# Patient Record
Sex: FEMALE | Race: WHITE | Employment: OTHER | URBAN - METROPOLITAN AREA
[De-identification: names, ages, dates, MRNs, and addresses within clinical notes are randomized per-mention and may not be internally consistent; named-entity substitution may affect disease eponyms.]

---

## 2018-06-15 ENCOUNTER — HOSPITAL ENCOUNTER (OUTPATIENT)
Age: 83
Discharge: HOME | End: 2018-06-17
Payer: MEDICARE

## 2018-06-15 DIAGNOSIS — E78.49 OTHER HYPERLIPIDEMIA: ICD-10-CM

## 2018-06-15 DIAGNOSIS — E03.9 ACQUIRED HYPOTHYROIDISM: ICD-10-CM

## 2018-06-15 DIAGNOSIS — I10 ESSENTIAL HYPERTENSION: ICD-10-CM

## 2018-06-15 LAB
ALBUMIN SERPL-MCNC: 3.6 G/DL (ref 3.5–5.2)
ALP BLD-CCNC: 61 U/L (ref 35–104)
ALT SERPL-CCNC: 11 U/L (ref 0–32)
ANION GAP SERPL CALCULATED.3IONS-SCNC: 14 MMOL/L (ref 7–16)
AST SERPL-CCNC: 28 U/L (ref 0–31)
BASOPHILS ABSOLUTE: 0.06 E9/L (ref 0–0.2)
BASOPHILS RELATIVE PERCENT: 1.2 % (ref 0–2)
BILIRUB SERPL-MCNC: 0.3 MG/DL (ref 0–1.2)
BUN BLDV-MCNC: 31 MG/DL (ref 8–23)
CALCIUM SERPL-MCNC: 9.2 MG/DL (ref 8.6–10.2)
CHLORIDE BLD-SCNC: 94 MMOL/L (ref 98–107)
CHOLESTEROL, TOTAL: 145 MG/DL (ref 0–199)
CO2: 25 MMOL/L (ref 22–29)
CREAT SERPL-MCNC: 0.9 MG/DL (ref 0.5–1)
EOSINOPHILS ABSOLUTE: 0.06 E9/L (ref 0.05–0.5)
EOSINOPHILS RELATIVE PERCENT: 1.2 % (ref 0–6)
GFR AFRICAN AMERICAN: >60
GFR NON-AFRICAN AMERICAN: 59 ML/MIN/1.73
GLUCOSE BLD-MCNC: 79 MG/DL (ref 74–109)
HCT VFR BLD CALC: 33.9 % (ref 34–48)
HDLC SERPL-MCNC: 73 MG/DL
HEMOGLOBIN: 10.8 G/DL (ref 11.5–15.5)
IMMATURE GRANULOCYTES #: 0.02 E9/L
IMMATURE GRANULOCYTES %: 0.4 % (ref 0–5)
LDL CHOLESTEROL CALCULATED: 63 MG/DL (ref 0–99)
LYMPHOCYTES ABSOLUTE: 1.15 E9/L (ref 1.5–4)
LYMPHOCYTES RELATIVE PERCENT: 23.9 % (ref 20–42)
MCH RBC QN AUTO: 28.9 PG (ref 26–35)
MCHC RBC AUTO-ENTMCNC: 31.9 % (ref 32–34.5)
MCV RBC AUTO: 90.6 FL (ref 80–99.9)
MONOCYTES ABSOLUTE: 0.66 E9/L (ref 0.1–0.95)
MONOCYTES RELATIVE PERCENT: 13.7 % (ref 2–12)
NEUTROPHILS ABSOLUTE: 2.86 E9/L (ref 1.8–7.3)
NEUTROPHILS RELATIVE PERCENT: 59.6 % (ref 43–80)
PDW BLD-RTO: 16.9 FL (ref 11.5–15)
PLATELET # BLD: 278 E9/L (ref 130–450)
PMV BLD AUTO: 12.2 FL (ref 7–12)
POTASSIUM SERPL-SCNC: 4.2 MMOL/L (ref 3.5–5)
RBC # BLD: 3.74 E12/L (ref 3.5–5.5)
SODIUM BLD-SCNC: 133 MMOL/L (ref 132–146)
TOTAL PROTEIN: 7.2 G/DL (ref 6.4–8.3)
TRIGL SERPL-MCNC: 46 MG/DL (ref 0–149)
TSH SERPL DL<=0.05 MIU/L-ACNC: 4.62 UIU/ML (ref 0.27–4.2)
VLDLC SERPL CALC-MCNC: 9 MG/DL
WBC # BLD: 4.8 E9/L (ref 4.5–11.5)

## 2018-06-15 PROCEDURE — 80061 LIPID PANEL: CPT

## 2018-06-15 PROCEDURE — 80053 COMPREHEN METABOLIC PANEL: CPT

## 2018-06-15 PROCEDURE — 84443 ASSAY THYROID STIM HORMONE: CPT

## 2018-06-15 PROCEDURE — 85025 COMPLETE CBC W/AUTO DIFF WBC: CPT

## 2019-08-04 ENCOUNTER — HOSPITAL ENCOUNTER (EMERGENCY)
Age: 84
Discharge: HOME | End: 2019-08-04
Attending: EMERGENCY MEDICINE
Payer: MEDICARE

## 2019-08-04 VITALS
DIASTOLIC BLOOD PRESSURE: 92 MMHG | HEART RATE: 88 BPM | RESPIRATION RATE: 18 BRPM | SYSTOLIC BLOOD PRESSURE: 161 MMHG | TEMPERATURE: 97.6 F | OXYGEN SATURATION: 96 %

## 2019-08-04 DIAGNOSIS — S81.811A LACERATION OF RIGHT LOWER EXTREMITY, INITIAL ENCOUNTER: Primary | ICD-10-CM

## 2019-08-04 PROCEDURE — 99283 EMERGENCY DEPT VISIT LOW MDM: CPT

## 2019-08-04 PROCEDURE — 2500000003 HC RX 250 WO HCPCS: Performed by: EMERGENCY MEDICINE

## 2019-08-04 PROCEDURE — 6370000000 HC RX 637 (ALT 250 FOR IP): Performed by: EMERGENCY MEDICINE

## 2019-08-04 PROCEDURE — 12005 RPR S/N/A/GEN/TRK12.6-20.0CM: CPT

## 2019-08-04 PROCEDURE — 90471 IMMUNIZATION ADMIN: CPT | Performed by: EMERGENCY MEDICINE

## 2019-08-04 PROCEDURE — 6360000002 HC RX W HCPCS: Performed by: EMERGENCY MEDICINE

## 2019-08-04 PROCEDURE — 90715 TDAP VACCINE 7 YRS/> IM: CPT | Performed by: EMERGENCY MEDICINE

## 2019-08-04 RX ORDER — DIAPER,BRIEF,INFANT-TODD,DISP
EACH MISCELLANEOUS ONCE
Status: COMPLETED | OUTPATIENT
Start: 2019-08-04 | End: 2019-08-04

## 2019-08-04 RX ORDER — LIDOCAINE HYDROCHLORIDE AND EPINEPHRINE 10; 10 MG/ML; UG/ML
20 INJECTION, SOLUTION INFILTRATION; PERINEURAL ONCE
Status: COMPLETED | OUTPATIENT
Start: 2019-08-04 | End: 2019-08-04

## 2019-08-04 RX ADMIN — TETANUS TOXOID, REDUCED DIPHTHERIA TOXOID AND ACELLULAR PERTUSSIS VACCINE, ADSORBED 0.5 ML: 5; 2.5; 8; 8; 2.5 SUSPENSION INTRAMUSCULAR at 15:18

## 2019-08-04 RX ADMIN — BACITRACIN ZINC: 500 OINTMENT TOPICAL at 15:34

## 2019-08-04 RX ADMIN — LIDOCAINE HYDROCHLORIDE AND EPINEPHRINE 20 ML: 10; 10 INJECTION, SOLUTION INFILTRATION; PERINEURAL at 15:34

## 2019-08-04 ASSESSMENT — ENCOUNTER SYMPTOMS
EYE PAIN: 0
BACK PAIN: 0
NAUSEA: 0
SORE THROAT: 0
SINUS PRESSURE: 0
EYE DISCHARGE: 0
WHEEZING: 0
VOMITING: 0
SHORTNESS OF BREATH: 0
DIARRHEA: 0
EYE REDNESS: 0
ABDOMINAL DISTENTION: 0
COUGH: 0

## 2019-08-04 ASSESSMENT — PAIN DESCRIPTION - PAIN TYPE: TYPE: ACUTE PAIN

## 2019-08-04 ASSESSMENT — PAIN SCALES - GENERAL
PAINLEVEL_OUTOF10: 0
PAINLEVEL_OUTOF10: 5

## 2019-08-04 ASSESSMENT — PAIN DESCRIPTION - ORIENTATION: ORIENTATION: RIGHT

## 2019-08-04 ASSESSMENT — PAIN DESCRIPTION - LOCATION: LOCATION: LEG

## 2019-08-04 NOTE — ED PROVIDER NOTES
80-year-old female history of hypertension, presenting to the emergency department by private vehicle for primary complaint of laceration to right lower extremity. Patient states she was boarding a bus, when she struck her right shin on the step resulting in a skin tear, the patient did not fall. The patient did not hit her head or lose consciousness. She is on a blood thinners. Last tetanus is unknown. Patient has no other complaints at this time. His current complaint is moderate in severity, better with nothing worse with nothing and constant. The history is provided by the patient. Review of Systems   Constitutional: Negative for chills and fever. HENT: Negative for ear pain, sinus pressure and sore throat. Eyes: Negative for pain, discharge and redness. Respiratory: Negative for cough, shortness of breath and wheezing. Cardiovascular: Negative for chest pain. Gastrointestinal: Negative for abdominal distention, diarrhea, nausea and vomiting. Genitourinary: Negative for dysuria and frequency. Musculoskeletal: Negative for arthralgias and back pain. Skin: Positive for wound. Neurological: Negative for weakness and headaches. Hematological: Negative for adenopathy. All other systems reviewed and are negative. Physical Exam   Constitutional: She is oriented to person, place, and time. She appears well-developed and well-nourished. HENT:   Head: Normocephalic and atraumatic. Right Ear: External ear normal.   Left Ear: External ear normal.   Mouth/Throat: Oropharynx is clear and moist.   Eyes: Pupils are equal, round, and reactive to light. Neck: Normal range of motion. Neck supple. Cardiovascular: Normal rate, regular rhythm and normal heart sounds. No murmur heard. Pulmonary/Chest: Effort normal and breath sounds normal. No respiratory distress. She has no wheezes. She has no rales. Abdominal: Soft. Bowel sounds are normal. There is no tenderness.  There is no rebound and no guarding. Musculoskeletal: She exhibits no edema. Neurological: She is alert and oriented to person, place, and time. No cranial nerve deficit. Coordination normal.   Skin: Skin is warm and dry. 8 cm x 10 cm skin tear/laceration of the patient's anterior right lower extremity and a chevron formation there is no evidence of pulsatile bleeding or bone exposure. There is no tenderness to torsion of the lower extremity, patient has full range of motion and sensation as well as good cap refill in the affected extremity. Nursing note and vitals reviewed. Procedures    Laceration Repair Procedure Note    Indication: Laceration    Procedure: The patient was placed in the appropriate position and anesthesia around the laceration was obtained by infiltration using 1% Lidocaine with epinephrine. The area was then draped in a sterile fashion. The laceration was closed with 3-0 Ethilon using interrupted sutures. There were no additional lacerations requiring repair. The wound area was then dressed with bacitracin. Minimal debridement was preformed, flaps were aligned. No foreign body was identified. Total repaired wound length: 18 cm. Other Items: Suture count: 11    The patient tolerated the procedure well. Complications: None    MDM  Number of Diagnoses or Management Options  Diagnosis management comments: 70-year-old female presenting to the emergency department with skin tear secondary to striking her shin on the step of a bus while attempting to board approximately 1 hour prior to arrival in emergency department. Physical exam remarkable for skin tear and a chevron formation to the right lower extremity. Patient's tetanus was updated.   She received partial suture repair for laceration.                --------------------------------------------- PAST HISTORY ---------------------------------------------  Past Medical History:  has a past medical history of Chronic back pain, objective evidence of an acute process requiring hospitalization or inpatient management. They have remained hemodynamically stable throughout their entire ED visit and are stable for discharge with outpatient follow-up. The plan has been discussed in detail and they are aware of the specific conditions for emergent return, as well as the importance of follow-up. New Prescriptions    No medications on file       Diagnosis:  1. Laceration of right lower extremity, initial encounter        Disposition:  Patient's disposition: Discharge to home  Patient's condition is stable.          Radha Sims DO  Resident  08/04/19 9142

## 2019-09-18 ENCOUNTER — APPOINTMENT (OUTPATIENT)
Dept: CT IMAGING | Age: 84
DRG: 281 | End: 2019-09-18
Payer: MEDICARE

## 2019-09-18 ENCOUNTER — HOSPITAL ENCOUNTER (INPATIENT)
Age: 84
LOS: 4 days | Discharge: NURSING, REHAB OR LONG-TERM CARE | DRG: 281 | End: 2019-09-23
Attending: EMERGENCY MEDICINE | Admitting: INTERNAL MEDICINE
Payer: MEDICARE

## 2019-09-18 ENCOUNTER — APPOINTMENT (OUTPATIENT)
Dept: GENERAL RADIOLOGY | Age: 84
DRG: 281 | End: 2019-09-18
Payer: MEDICARE

## 2019-09-18 DIAGNOSIS — M62.82 NON-TRAUMATIC RHABDOMYOLYSIS: ICD-10-CM

## 2019-09-18 DIAGNOSIS — I21.4 NSTEMI (NON-ST ELEVATED MYOCARDIAL INFARCTION) (HCC): Primary | ICD-10-CM

## 2019-09-18 LAB
ANION GAP SERPL CALCULATED.3IONS-SCNC: 10 MMOL/L (ref 7–16)
BACTERIA: ABNORMAL /HPF
BASOPHILS ABSOLUTE: 0.02 E9/L (ref 0–0.2)
BASOPHILS RELATIVE PERCENT: 0.2 % (ref 0–2)
BILIRUBIN URINE: NEGATIVE
BLOOD, URINE: ABNORMAL
BUN BLDV-MCNC: 34 MG/DL (ref 8–23)
CALCIUM SERPL-MCNC: 9.4 MG/DL (ref 8.6–10.2)
CHLORIDE BLD-SCNC: 103 MMOL/L (ref 98–107)
CLARITY: CLEAR
CO2: 29 MMOL/L (ref 22–29)
COLOR: YELLOW
CREAT SERPL-MCNC: 0.9 MG/DL (ref 0.5–1)
EOSINOPHILS ABSOLUTE: 0 E9/L (ref 0.05–0.5)
EOSINOPHILS RELATIVE PERCENT: 0 % (ref 0–6)
GFR AFRICAN AMERICAN: >60
GFR NON-AFRICAN AMERICAN: 59 ML/MIN/1.73
GLUCOSE BLD-MCNC: 111 MG/DL (ref 74–99)
GLUCOSE URINE: NEGATIVE MG/DL
HCT VFR BLD CALC: 38.5 % (ref 34–48)
HEMOGLOBIN: 12.5 G/DL (ref 11.5–15.5)
IMMATURE GRANULOCYTES #: 0.04 E9/L
IMMATURE GRANULOCYTES %: 0.5 % (ref 0–5)
KETONES, URINE: ABNORMAL MG/DL
LEUKOCYTE ESTERASE, URINE: NEGATIVE
LYMPHOCYTES ABSOLUTE: 0.75 E9/L (ref 1.5–4)
LYMPHOCYTES RELATIVE PERCENT: 8.6 % (ref 20–42)
MAGNESIUM: 2.2 MG/DL (ref 1.6–2.6)
MCH RBC QN AUTO: 31 PG (ref 26–35)
MCHC RBC AUTO-ENTMCNC: 32.5 % (ref 32–34.5)
MCV RBC AUTO: 95.5 FL (ref 80–99.9)
MONOCYTES ABSOLUTE: 0.84 E9/L (ref 0.1–0.95)
MONOCYTES RELATIVE PERCENT: 9.6 % (ref 2–12)
NEUTROPHILS ABSOLUTE: 7.11 E9/L (ref 1.8–7.3)
NEUTROPHILS RELATIVE PERCENT: 81.1 % (ref 43–80)
NITRITE, URINE: NEGATIVE
PDW BLD-RTO: 16.9 FL (ref 11.5–15)
PH UA: 6 (ref 5–9)
PLATELET # BLD: 276 E9/L (ref 130–450)
PMV BLD AUTO: 10.8 FL (ref 7–12)
POTASSIUM REFLEX MAGNESIUM: 3.9 MMOL/L (ref 3.5–5)
PRO-BNP: 7373 PG/ML (ref 0–450)
PROTEIN UA: 100 MG/DL
RBC # BLD: 4.03 E12/L (ref 3.5–5.5)
RBC UA: ABNORMAL /HPF (ref 0–2)
SODIUM BLD-SCNC: 142 MMOL/L (ref 132–146)
SPECIFIC GRAVITY UA: 1.02 (ref 1–1.03)
TOTAL CK: 1498 U/L (ref 20–180)
TROPONIN: 1.12 NG/ML (ref 0–0.03)
TROPONIN: 1.22 NG/ML (ref 0–0.03)
TSH SERPL DL<=0.05 MIU/L-ACNC: 6.22 UIU/ML (ref 0.27–4.2)
UROBILINOGEN, URINE: 0.2 E.U./DL
WBC # BLD: 8.8 E9/L (ref 4.5–11.5)
WBC UA: ABNORMAL /HPF (ref 0–5)

## 2019-09-18 PROCEDURE — 81001 URINALYSIS AUTO W/SCOPE: CPT

## 2019-09-18 PROCEDURE — 71045 X-RAY EXAM CHEST 1 VIEW: CPT

## 2019-09-18 PROCEDURE — 99285 EMERGENCY DEPT VISIT HI MDM: CPT

## 2019-09-18 PROCEDURE — 6360000004 HC RX CONTRAST MEDICATION: Performed by: RADIOLOGY

## 2019-09-18 PROCEDURE — 36415 COLL VENOUS BLD VENIPUNCTURE: CPT

## 2019-09-18 PROCEDURE — 6370000000 HC RX 637 (ALT 250 FOR IP): Performed by: EMERGENCY MEDICINE

## 2019-09-18 PROCEDURE — 85025 COMPLETE CBC W/AUTO DIFF WBC: CPT

## 2019-09-18 PROCEDURE — 70450 CT HEAD/BRAIN W/O DYE: CPT

## 2019-09-18 PROCEDURE — 93005 ELECTROCARDIOGRAM TRACING: CPT | Performed by: RADIOLOGY

## 2019-09-18 PROCEDURE — 71275 CT ANGIOGRAPHY CHEST: CPT

## 2019-09-18 PROCEDURE — 6360000002 HC RX W HCPCS: Performed by: EMERGENCY MEDICINE

## 2019-09-18 PROCEDURE — 83735 ASSAY OF MAGNESIUM: CPT

## 2019-09-18 PROCEDURE — 72125 CT NECK SPINE W/O DYE: CPT

## 2019-09-18 PROCEDURE — 93005 ELECTROCARDIOGRAM TRACING: CPT | Performed by: EMERGENCY MEDICINE

## 2019-09-18 PROCEDURE — 83880 ASSAY OF NATRIURETIC PEPTIDE: CPT

## 2019-09-18 PROCEDURE — 84443 ASSAY THYROID STIM HORMONE: CPT

## 2019-09-18 PROCEDURE — 82550 ASSAY OF CK (CPK): CPT

## 2019-09-18 PROCEDURE — 80048 BASIC METABOLIC PNL TOTAL CA: CPT

## 2019-09-18 PROCEDURE — 2580000003 HC RX 258: Performed by: EMERGENCY MEDICINE

## 2019-09-18 PROCEDURE — 96374 THER/PROPH/DIAG INJ IV PUSH: CPT

## 2019-09-18 PROCEDURE — 84484 ASSAY OF TROPONIN QUANT: CPT

## 2019-09-18 RX ORDER — ASPIRIN 81 MG/1
324 TABLET, CHEWABLE ORAL ONCE
Status: COMPLETED | OUTPATIENT
Start: 2019-09-18 | End: 2019-09-18

## 2019-09-18 RX ORDER — 0.9 % SODIUM CHLORIDE 0.9 %
1000 INTRAVENOUS SOLUTION INTRAVENOUS ONCE
Status: COMPLETED | OUTPATIENT
Start: 2019-09-18 | End: 2019-09-18

## 2019-09-18 RX ADMIN — ENOXAPARIN SODIUM 50 MG: 60 INJECTION SUBCUTANEOUS at 21:15

## 2019-09-18 RX ADMIN — ASPIRIN 81 MG 324 MG: 81 TABLET ORAL at 21:15

## 2019-09-18 RX ADMIN — IOPAMIDOL 75 ML: 755 INJECTION, SOLUTION INTRAVENOUS at 20:53

## 2019-09-18 RX ADMIN — SODIUM CHLORIDE 1000 ML: 9 INJECTION, SOLUTION INTRAVENOUS at 21:15

## 2019-09-18 NOTE — ED PROVIDER NOTES
ATTENDING PROVIDER ATTESTATION:     Heike Mo presented to the emergency department for evaluation of Fall (fall earlier today, found on the ground, pt denies LOC -thinners. Pt has abrasion to chin and right cheek. Tentanus UTD per patient. Pt denies any pain at this time. )   and was initially evaluated by the Medical Resident. See Original ED Note for H&P and ED course above. I have reviewed and discussed the case, including pertinent history (medical, surgical, family and social) and exam findings with the Medical Resident assigned to Heike Mo. I have personally performed and/or participated in the history, exam, medical decision making, and procedures and agree with all pertinent clinical information. Critical Care:  Please note that the withdrawal or failure to initiate urgent interventions for this patient would likely result in a life threatening deterioration or permanent disability. Accordingly this patient received 45 minutes of critical care time, excluding separately billable procedures. I have reviewed my findings and recommendations with the assigned Medical Resident, Heike Mo and members of family present at the time of disposition. My findings/plan: The primary encounter diagnosis was NSTEMI (non-ST elevated myocardial infarction) ([facility] [address] A diagnosis of Non-traumatic rhabdomyolysis was also pertinent to this visit. Current Discharge Medication List        Apolinar Runner, MD       HPI:  9/18/19,   Time: 5:01 PM         Heike Mo is a 80 y.o. female presenting to the ED after a fall last night. She lives at [facility]. She states that she remembers being on the floor this morning awoke by her staff and not kim to get up. She remembers going to bed around 9:00 pm last night. She does not remember what happened last night or how she reaches the floor. She denies being on any blood thinners. She does admit to bilateral leg weakness.  She normally Basophils % 0.2 0.0 - 2.0 %    Neutrophils Absolute 7.11 1.80 - 7.30 E9/L    Immature Granulocytes # 0.04 E9/L    Lymphocytes Absolute 0.75 (L) 1.50 - 4.00 E9/L    Monocytes Absolute 0.84 0.10 - 0.95 E9/L    Eosinophils Absolute 0.00 (L) 0.05 - 0.50 E9/L    Basophils Absolute 0.02 0.00 - 0.20 P8/N   Basic Metabolic Panel w/ Reflex to MG   Result Value Ref Range    Sodium 142 132 - 146 mmol/L    Potassium reflex Magnesium 3.9 3.5 - 5.0 mmol/L    Chloride 103 98 - 107 mmol/L    CO2 29 22 - 29 mmol/L    Anion Gap 10 7 - 16 mmol/L    Glucose 111 (H) 74 - 99 mg/dL    BUN 34 (H) 8 - 23 mg/dL    CREATININE 0.9 0.5 - 1.0 mg/dL    GFR Non-African American 59 >=60 mL/min/1.73    GFR African American >60     Calcium 9.4 8.6 - 10.2 mg/dL   Troponin   Result Value Ref Range    Troponin 1.22 (H) 0.00 - 0.03 ng/mL   TSH without Reflex   Result Value Ref Range    TSH 6.220 (H) 0.270 - 4.200 uIU/mL   Urinalysis, reflex to microscopic   Result Value Ref Range    Color, UA Yellow Straw/Yellow    Clarity, UA Clear Clear    Glucose, Ur Negative Negative mg/dL    Bilirubin Urine Negative Negative    Ketones, Urine TRACE (A) Negative mg/dL    Specific Gravity, UA 1.020 1.005 - 1.030    Blood, Urine MODERATE (A) Negative    pH, UA 6.0 5.0 - 9.0    Protein,  (A) Negative mg/dL    Urobilinogen, Urine 0.2 <2.0 E.U./dL    Nitrite, Urine Negative Negative    Leukocyte Esterase, Urine Negative Negative   Microscopic Urinalysis   Result Value Ref Range    WBC, UA NONE 0 - 5 /HPF    RBC, UA 2-5 0 - 2 /HPF    Bacteria, UA RARE (A) /HPF   Brain Natriuretic Peptide   Result Value Ref Range    Pro-BNP 7,373 (H) 0 - 450 pg/mL   Magnesium   Result Value Ref Range    Magnesium 2.2 1.6 - 2.6 mg/dL   Troponin   Result Value Ref Range    Troponin 1.12 (H) 0.00 - 0.03 ng/mL   EKG 12 Lead   Result Value Ref Range    Ventricular Rate 80 BPM    Atrial Rate 80 BPM    P-R Interval 146 ms    QRS Duration 134 ms    Q-T Interval 444 ms    QTc Calculation

## 2019-09-18 NOTE — ED NOTES
Bed: 18A-18  Expected date:   Expected time:   Means of arrival:   Comments:  ems     Tabitha Thompson RN  09/18/19 2652

## 2019-09-19 ENCOUNTER — APPOINTMENT (OUTPATIENT)
Dept: GENERAL RADIOLOGY | Age: 84
DRG: 281 | End: 2019-09-19
Payer: MEDICARE

## 2019-09-19 PROBLEM — I21.4 NSTEMI (NON-ST ELEVATED MYOCARDIAL INFARCTION) (HCC): Status: ACTIVE | Noted: 2019-09-19

## 2019-09-19 LAB
EKG ATRIAL RATE: 681 BPM
EKG ATRIAL RATE: 80 BPM
EKG ATRIAL RATE: 84 BPM
EKG P AXIS: 49 DEGREES
EKG P AXIS: 53 DEGREES
EKG P AXIS: 57 DEGREES
EKG P-R INTERVAL: 146 MS
EKG P-R INTERVAL: 170 MS
EKG Q-T INTERVAL: 428 MS
EKG Q-T INTERVAL: 444 MS
EKG Q-T INTERVAL: 444 MS
EKG QRS DURATION: 110 MS
EKG QRS DURATION: 134 MS
EKG QRS DURATION: 62 MS
EKG QTC CALCULATION (BAZETT): 505 MS
EKG QTC CALCULATION (BAZETT): 512 MS
EKG QTC CALCULATION (BAZETT): 524 MS
EKG R AXIS: 43 DEGREES
EKG R AXIS: 83 DEGREES
EKG R AXIS: 89 DEGREES
EKG T AXIS: 39 DEGREES
EKG T AXIS: 43 DEGREES
EKG T AXIS: 54 DEGREES
EKG VENTRICULAR RATE: 80 BPM
EKG VENTRICULAR RATE: 84 BPM
EKG VENTRICULAR RATE: 84 BPM

## 2019-09-19 PROCEDURE — 6370000000 HC RX 637 (ALT 250 FOR IP): Performed by: INTERNAL MEDICINE

## 2019-09-19 PROCEDURE — 2060000000 HC ICU INTERMEDIATE R&B

## 2019-09-19 PROCEDURE — 97530 THERAPEUTIC ACTIVITIES: CPT | Performed by: PHYSICAL THERAPIST

## 2019-09-19 PROCEDURE — 6360000002 HC RX W HCPCS: Performed by: INTERNAL MEDICINE

## 2019-09-19 PROCEDURE — 93010 ELECTROCARDIOGRAM REPORT: CPT | Performed by: INTERNAL MEDICINE

## 2019-09-19 PROCEDURE — 73502 X-RAY EXAM HIP UNI 2-3 VIEWS: CPT

## 2019-09-19 PROCEDURE — APPSS60 APP SPLIT SHARED TIME 46-60 MINUTES: Performed by: NURSE PRACTITIONER

## 2019-09-19 PROCEDURE — 99222 1ST HOSP IP/OBS MODERATE 55: CPT | Performed by: INTERNAL MEDICINE

## 2019-09-19 PROCEDURE — 97161 PT EVAL LOW COMPLEX 20 MIN: CPT | Performed by: PHYSICAL THERAPIST

## 2019-09-19 PROCEDURE — 97535 SELF CARE MNGMENT TRAINING: CPT

## 2019-09-19 PROCEDURE — 97166 OT EVAL MOD COMPLEX 45 MIN: CPT

## 2019-09-19 RX ORDER — AMLODIPINE BESYLATE 5 MG/1
5 TABLET ORAL DAILY
Status: DISCONTINUED | OUTPATIENT
Start: 2019-09-19 | End: 2019-09-23 | Stop reason: HOSPADM

## 2019-09-19 RX ORDER — ISOSORBIDE MONONITRATE 30 MG/1
30 TABLET, EXTENDED RELEASE ORAL DAILY
Status: DISCONTINUED | OUTPATIENT
Start: 2019-09-19 | End: 2019-09-23 | Stop reason: HOSPADM

## 2019-09-19 RX ORDER — FERROUS SULFATE 325(65) MG
325 TABLET ORAL 2 TIMES DAILY
Status: DISCONTINUED | OUTPATIENT
Start: 2019-09-19 | End: 2019-09-23 | Stop reason: HOSPADM

## 2019-09-19 RX ORDER — METOPROLOL SUCCINATE 25 MG/1
25 TABLET, EXTENDED RELEASE ORAL DAILY
Status: DISCONTINUED | OUTPATIENT
Start: 2019-09-19 | End: 2019-09-23 | Stop reason: HOSPADM

## 2019-09-19 RX ORDER — ASPIRIN 81 MG/1
81 TABLET ORAL DAILY
Status: DISCONTINUED | OUTPATIENT
Start: 2019-09-19 | End: 2019-09-23 | Stop reason: HOSPADM

## 2019-09-19 RX ORDER — LEVOTHYROXINE SODIUM 0.05 MG/1
50 TABLET ORAL DAILY
Status: DISCONTINUED | OUTPATIENT
Start: 2019-09-19 | End: 2019-09-23 | Stop reason: HOSPADM

## 2019-09-19 RX ORDER — VALSARTAN 80 MG/1
80 TABLET ORAL DAILY
COMMUNITY
End: 2019-12-21

## 2019-09-19 RX ORDER — ATORVASTATIN CALCIUM 40 MG/1
40 TABLET, FILM COATED ORAL NIGHTLY
Status: DISCONTINUED | OUTPATIENT
Start: 2019-09-19 | End: 2019-09-23 | Stop reason: HOSPADM

## 2019-09-19 RX ORDER — DONEPEZIL HYDROCHLORIDE 5 MG/1
10 TABLET, FILM COATED ORAL NIGHTLY
Status: DISCONTINUED | OUTPATIENT
Start: 2019-09-19 | End: 2019-09-23 | Stop reason: HOSPADM

## 2019-09-19 RX ORDER — ATORVASTATIN CALCIUM 10 MG/1
1 TABLET, FILM COATED ORAL DAILY
Status: DISCONTINUED | OUTPATIENT
Start: 2019-09-19 | End: 2019-09-19

## 2019-09-19 RX ORDER — VALSARTAN 80 MG/1
80 TABLET ORAL DAILY
Status: DISCONTINUED | OUTPATIENT
Start: 2019-09-19 | End: 2019-09-23 | Stop reason: HOSPADM

## 2019-09-19 RX ADMIN — VALSARTAN 80 MG: 80 TABLET, FILM COATED ORAL at 12:30

## 2019-09-19 RX ADMIN — FERROUS SULFATE TAB 325 MG (65 MG ELEMENTAL FE) 325 MG: 325 (65 FE) TAB at 12:29

## 2019-09-19 RX ADMIN — ENOXAPARIN SODIUM 50 MG: 60 INJECTION SUBCUTANEOUS at 20:25

## 2019-09-19 RX ADMIN — AMLODIPINE BESYLATE 5 MG: 5 TABLET ORAL at 12:29

## 2019-09-19 RX ADMIN — ISOSORBIDE MONONITRATE 30 MG: 30 TABLET ORAL at 10:20

## 2019-09-19 RX ADMIN — ATORVASTATIN CALCIUM 40 MG: 40 TABLET, FILM COATED ORAL at 20:25

## 2019-09-19 RX ADMIN — DONEPEZIL HYDROCHLORIDE 10 MG: 5 TABLET, FILM COATED ORAL at 20:25

## 2019-09-19 RX ADMIN — ENOXAPARIN SODIUM 50 MG: 60 INJECTION SUBCUTANEOUS at 10:21

## 2019-09-19 RX ADMIN — ASPIRIN 81 MG: 81 TABLET, COATED ORAL at 10:33

## 2019-09-19 RX ADMIN — FERROUS SULFATE TAB 325 MG (65 MG ELEMENTAL FE) 325 MG: 325 (65 FE) TAB at 20:25

## 2019-09-19 RX ADMIN — METOPROLOL SUCCINATE 25 MG: 25 TABLET, EXTENDED RELEASE ORAL at 10:20

## 2019-09-19 RX ADMIN — LEVOTHYROXINE SODIUM 50 MCG: 50 TABLET ORAL at 12:30

## 2019-09-19 ASSESSMENT — PAIN SCALES - GENERAL
PAINLEVEL_OUTOF10: 0

## 2019-09-19 NOTE — ED NOTES
Assumed care of pt at this time. Per pt son, pt resides at [facility] and was found down by another resident. Pt uses cane for ambulation. Pt does not appear in distress at this time. When questioned as to why she is here, pt states \"I guess to get checked out\". Pt family is made aware that pt will be admitted and awaiting call back at this time. Also made aware of additional testing that was ordered by provider and that she should be going to scan soon. Pt has no complaints at this time. Respirations are easy and unlabored. Cardiac monitoring in place. No signs of distress noted. Call light within reach. Will continue to monitor.      Jackie Guzman RN  09/18/19 0939

## 2019-09-19 NOTE — CONSULTS
complaints  · Integumentary: Denies rash, hives or pruritis   · Neurological: Denies dizziness, headaches or seizures. No numbness or tingling  · Psychiatric: Denies anxiety or depression. · Endocrine: Denies temperature intolerance. No recent weight change. .  · Hematologic/Lymphatic: Denies abnormal bruising or bleeding. No swollen lymph nodes    PHYSICAL EXAM:   /72   Pulse 91   Temp 97.3 °F (36.3 °C) (Temporal)   Resp 18   Ht 5' 2\" (1.575 m)   Wt 100 lb 3.2 oz (45.5 kg)   SpO2 94%   BMI 18.33 kg/m²   CONST:  Elderly female who appears of stated age. Awake, alert and cooperative. No apparent distress. HEENT:   Head- Normocephalic. +skin abrasion to right cheek and chin. Eyes- Conjunctivae pink, anicteric  Throat- Oral mucosa pink and moist  Neck-  No stridor, trachea midline, no jugular venous distention. No carotid bruit. CHEST: Chest symmetrical and non-tender to palpation. No accessory muscle use or intercostal retractions  RESPIRATORY: Lung sounds - clear throughout fields   CARDIOVASCULAR:     Heart Inspection- shows no noted pulsations  Heart Palpation- no heaves or thrills; PMI is non-displaced   Heart Ausculation- Regular rate and rhythm, no murmur. No s3, s4 or rub   PV: No lower extremity edema. No varicosities. Pedal pulses palpable, no clubbing or cyanosis   ABDOMEN: Soft, non-tender to light palpation. Bowel sounds present. No palpable masses no organomegaly; no abdominal bruit  MS: Good muscle strength and tone. No atrophy or abnormal movements. : Deferred  SKIN: Warm and dry. +scab over right shin - healing. NEURO / PSYCH: Oriented to person, place only. Speech clear and appropriate. Follows all commands. Pleasant affect     DATA:    Tele strips: SR.   EKG: see HPI.    Diagnostic:      Intake/Output Summary (Last 24 hours) at 9/19/2019 0830  Last data filed at 9/19/2019 0556  Gross per 24 hour   Intake 1000 ml   Output 175 ml   Net 825 ml       Labs:   CBC:   Recent Labs and medical decision making. See accompanying documentation for full consult. ASSESSMENT AND PLAN:  1. ACS/NSTEMI:    Patient desires conservative treatment, which is very reasonable. Lovenox x 48 hours. Echo to guide therapy. ASA/statin/BB/imdur. 2. HTN: Observe. 3. Lipids: Statin. 4. Hypothyroid. Yecenia Guerrero D.O.   Cardiologist  Cardiology, [facility]

## 2019-09-19 NOTE — H&P
Mukesh Steinberg MD FACP   History and Physical      CHIEF COMPLAINT:  Patient fell at home      HISTORY OF PRESENT ILLNESS:    25-year-old  woman seen on [address] earlier this afternoon at the main campus  Spoke with the ER physician overnight  Data reviewed in detail  Multiple family members at bedside including Dr. Hemant Yoon  Patient lives in independent living at [place] [address] on the floor by a neighbor  Patient is a poor historian with dementia  She does not recall if she had syncope  She definitely denied chest pain or shortness of breath  Sustained injuries to multiple body parts  ER workup revealed a troponin elevation and CPK elevation  Patient was fairly comfortable at the time of my examination  Denied headache      Right hip pain present    Past Medical History:    Past Medical History:   Diagnosis Date    Chronic back pain     Hyperlipidemia     Hypertension     Thyroid disease        Past Surgical History:    Past Surgical History:   Procedure Laterality Date    TONSILLECTOMY         Medications Prior to Admission:    Medications Prior to Admission: valsartan (DIOVAN) 80 MG tablet, Take 80 mg by mouth daily  [DISCONTINUED] donepezil (ARICEPT) 10 MG tablet, TAKE 1 TABLET BY MOUTH EVERY NIGHT  [DISCONTINUED] ferrous sulfate (FEOSOL) 325 (65 Fe) MG tablet, Take 1 tablet by mouth 2 times daily Indications: Per Dr. Sander Zacarias reference to lab results on 7/2/19  amLODIPine (NORVASC) 5 MG tablet, TAKE 1 TABLET BY MOUTH DAILY  levothyroxine (SYNTHROID) 50 MCG tablet, TAKE 1 TABLET BY MOUTH DAILY  atorvastatin (LIPITOR) 10 MG tablet, TAKE 1 TABLET BY MOUTH DAILY  [DISCONTINUED] donepezil (ARICEPT) 5 MG tablet, Take 1 tablet by mouth nightly  [DISCONTINUED] Handicap Placard MISC, by Does not apply route Patient cannot walk 200 ft without stopping to rest.   Expiration 2021  Lactobacillus (PROBIOTIC ACIDOPHILUS) TABS, Take 1 tablet by mouth every other day  b complex vitamins capsule, Take 1 capsule fracture  Advancing dementia  Essential hypertension  Hypothyroidism  Malnutrition  rhabdomyolysis  Frozen right shoulder    PLAN:  Cardiology input appreciated  Ace inhibitors beta blockers and nitrates along with aspirin therapy  On Lovenox therapeutic dose  Medical management requested by family  Check hip x-ray  PTOT consult  Needs  subacute rehabilitation placement    Enrrique Juarez  3:22 PM  9/19/2019

## 2019-09-19 NOTE — PROGRESS NOTES
ambulate to/stand for task  Mod A for steadying/SUP for tasks  Standing At The Sink   UB Dressing Mod A/Set up    Required Mod A to don garment d/t pain/limited ROM R UE while seated EOB (Simulated)  Min A   LB Dressing Max A    Max A to don socks w/ adaptive tech seated EOB  Max A for standing balance d/t Severe Posterior lean + Max A for clothing adjustment over hips (simulated)  Mod A   Bathing NT      Mod A   Toileting NT      Mod A   Bed Mobility  Rolling: Mod A  Repositioning:  Max A   Supine to Sit:  Mod A    Sit to Supine:  Max A         Mod A   Functional Transfers Sit to stand: Max A  Stand to sit: Max A       From EOB, pt ed for safe tech  Mod A   Functional Mobility Max A w/ Hand-held assist    Pt declined to trial FWW at b/s stating \"I don't want to get dependent on that thing\", Severe posterior lean on EOB, side-stepped ~ 2 steps to Right + ~ 3 steps to Left along EOB  Mod A   Balance Sitting:  Close SUP     Static:  Close SUP    Dynamic:  Close SUP - no LOB w/ attempt at LB dressing task seated EOB    Standing: Max A     Static:  Max A    Dynamic:  Max A d/t severe posterior lean     Activity Tolerance Tolerated Sitting:  EOB ~ 20 mins  Tolerated Standing:  ~ 2-3 mins     Visual/  Perceptual WFL  Glasses:  Yes      Hearing WFL  Hearing Aids  No       Hand dominance: Right    UE ROM: RUE: Distally WFL, Shoulder flex to ~ 80*, Abduction to ~ 45*      LUE: WFL throughout    Strength: RUE: grossly 3/5 proximally, 4-/5 distally     LUE: grossly 3+/5 proximally, 4/5 distally     Strength:  WFL Nguyễn UEs    Fine Motor Coordination:  WFL Nguyễn UEs    Sensation:  Denies numbness or tingling Nguyễn UEs  Tone:  WFL Nguyễn UEs  Edema:  None Noted                            Treatment:       -- Education:  Provided Pt/Family ed re: Benefits/Purpose of OT services;  OT Plan of Care;  Transfer Safety;   Benefits of use of DME/AD/Adaptive equip/techs to increase safety/IND with Functional Ax - FWW, Reacher, Sock Aid; Transfer training [x] Patient and/or Family Education [x]  Functional Mobility training [x]  Environmental Modifications [x]  Cognitive re-training [x]   Compensatory techniques for ADLs [x]  Splinting Needs []   Positioning to improve overall function [x]   Therapeutic Activity [x]   Therapeutic Exercise  [x]  Visual/Perceptual: [x]    Delirium prevention/treatment  []  Other:  [x]    Rehab Potential:  Good (-) for established goals    Patient / Family Goal:  Not stated at this time     Patient and/or Family were instructed on Functional Diagnosis, Prognosis/Goals and OT Plan of Care. Demonstrated Good(-) understanding. Evaluation Time includes thorough review of current medical information, gathering information on past medical history/social history and prior level of function, completion of standardized testing/informal observation of tasks, assessment of data and education on plan of care and goals.         Mod Evaluation + 25 timed treatment minutes  Tx Time in:  1419  Tx Time out:  1255 [address], OTR/L #[id number]

## 2019-09-20 LAB
LV EF: 55 %
LVEF MODALITY: NORMAL

## 2019-09-20 PROCEDURE — 6360000002 HC RX W HCPCS: Performed by: INTERNAL MEDICINE

## 2019-09-20 PROCEDURE — 99233 SBSQ HOSP IP/OBS HIGH 50: CPT | Performed by: INTERNAL MEDICINE

## 2019-09-20 PROCEDURE — 93306 TTE W/DOPPLER COMPLETE: CPT

## 2019-09-20 PROCEDURE — 6370000000 HC RX 637 (ALT 250 FOR IP): Performed by: INTERNAL MEDICINE

## 2019-09-20 PROCEDURE — 6370000000 HC RX 637 (ALT 250 FOR IP)

## 2019-09-20 PROCEDURE — 2060000000 HC ICU INTERMEDIATE R&B

## 2019-09-20 RX ORDER — ACETAMINOPHEN 325 MG/1
650 TABLET ORAL EVERY 4 HOURS PRN
Status: DISCONTINUED | OUTPATIENT
Start: 2019-09-20 | End: 2019-09-23 | Stop reason: HOSPADM

## 2019-09-20 RX ORDER — ACETAMINOPHEN 325 MG/1
TABLET ORAL
Status: COMPLETED
Start: 2019-09-20 | End: 2019-09-20

## 2019-09-20 RX ADMIN — AMLODIPINE BESYLATE 5 MG: 5 TABLET ORAL at 07:50

## 2019-09-20 RX ADMIN — VALSARTAN 80 MG: 80 TABLET, FILM COATED ORAL at 07:50

## 2019-09-20 RX ADMIN — ENOXAPARIN SODIUM 50 MG: 60 INJECTION SUBCUTANEOUS at 07:50

## 2019-09-20 RX ADMIN — FERROUS SULFATE TAB 325 MG (65 MG ELEMENTAL FE) 325 MG: 325 (65 FE) TAB at 20:31

## 2019-09-20 RX ADMIN — FERROUS SULFATE TAB 325 MG (65 MG ELEMENTAL FE) 325 MG: 325 (65 FE) TAB at 07:50

## 2019-09-20 RX ADMIN — ISOSORBIDE MONONITRATE 30 MG: 30 TABLET ORAL at 07:50

## 2019-09-20 RX ADMIN — ATORVASTATIN CALCIUM 40 MG: 40 TABLET, FILM COATED ORAL at 20:31

## 2019-09-20 RX ADMIN — ASPIRIN 81 MG: 81 TABLET, COATED ORAL at 07:50

## 2019-09-20 RX ADMIN — METOPROLOL SUCCINATE 25 MG: 25 TABLET, EXTENDED RELEASE ORAL at 07:50

## 2019-09-20 RX ADMIN — ACETAMINOPHEN 650 MG: 325 TABLET, FILM COATED ORAL at 13:49

## 2019-09-20 RX ADMIN — DONEPEZIL HYDROCHLORIDE 10 MG: 5 TABLET, FILM COATED ORAL at 20:31

## 2019-09-20 RX ADMIN — LEVOTHYROXINE SODIUM 50 MCG: 50 TABLET ORAL at 06:55

## 2019-09-20 ASSESSMENT — PAIN SCALES - GENERAL
PAINLEVEL_OUTOF10: 5
PAINLEVEL_OUTOF10: 0

## 2019-09-20 NOTE — CARE COORDINATION
Completed [facility] forms. Forms on soft chart. Discharge Plan is [id number] [facility] then Assisted Living. Will meet criteria for skilled on Sunday.

## 2019-09-20 NOTE — DISCHARGE INSTR - COC
Continuity of Care Form    Patient Name: Dev Hernandez   :  1929  MRN:  [id number]    Admit date:  2019  Discharge date:  19    Code Status Order: No Order   Advance Directives:   Advance Care Flowsheet Documentation     Date/Time Healthcare Directive Type of Healthcare Directive Copy in [address] Agent's Name Healthcare Agent's Phone Number    [phone]  Yes, patient has an advance directive for healthcare treatment  Durable power of  for health care  Yes, copy in chart  Healthcare power of   Jarad Clark  [phone]          Admitting Physician:  Taty Rudolph MD  PCP: Taty Rudolph MD    Discharging Nurse: [facility] Unit/Room#: 0688/1817-O  Discharging Unit Phone Number: [phone]    Emergency Contact:   Extended Emergency Contact Information  Primary Emergency Contact: Rachel Howell [address] Phone: [phone]  Mobile Phone: [phone]  Relation: Niece/Nephew  Secondary Emergency Contact: [address] [place] [address] Phone: [phone]  Relation: Child  Guardian: Amisha Hazel  Mobile Phone: [phone]  Relation: Niece/Nephew    Past Surgical History:  Past Surgical History:   Procedure Laterality Date    TONSILLECTOMY         Immunization History:   Immunization History   Administered Date(s) Administered    Influenza A (W5G5-60) Vaccine PF IM 2010    Influenza, High Dose (Fluzone 65 yrs and older) 2013, 10/07/2014, 10/23/2015, 2016, 2017, 2018    Pneumococcal Conjugate 13-valent ([id number]) 10/23/2015    Pneumococcal Polysaccharide (Lxyrcaczq56) 2007, 2008    Tdap (Boostrix, Adacel) 2019    Zoster Live (Zostavax) 2008       Active Problems:  Patient Active Problem List   Diagnosis Code    Hyperlipemia E78.5    Hypothyroid E03.9    HTN (hypertension) I10    Idiopathic scoliosis of thoracolumbar spine M41.25    Spondylosis of lumbar Hearing    Nutrition Therapy:  Current Nutrition Therapy:   - Oral Diet:  Cardiac    Routes of Feeding: Oral  Liquids: Thin Liquids  Daily Fluid Restriction: no  Last Modified Barium Swallow with Video (Video Swallowing Test): not done    Treatments at the Time of Hospital Discharge:   Respiratory Treatments:   Oxygen Therapy:  is not on home oxygen therapy. Ventilator:    - No ventilator support    Rehab Therapies: Physical Therapy and Occupational Therapy  Weight Bearing Status/Restrictions: No weight bearing restirctions  Other Medical Equipment (for information only, NOT a DME order):  wheelchair, walker, bedside commode and hospital bed  Other Treatments:     Patient's personal belongings (please select all that are sent with patient):  Glasses, Hearing Aides bilateral    RN SIGNATURE:  Electronically signed by Douglas Burkitt, RN on 9/23/19 at 4:36 PM    CASE MANAGEMENT/SOCIAL WORK SECTION    Inpatient Status Date: ***    Readmission Risk Assessment Score:  Readmission Risk              Risk of Unplanned Readmission:        10           Discharging to Facility/ Agency   · Name: [facility]  · Address:  · Phone:  · Fax:    Dialysis Facility (if applicable)   · Name:  · Address:  · Dialysis Schedule:  · Phone:  · Fax:    / signature: Electronically signed by TALAT Moser on 9/20/19 at 10:18 AM    PHYSICIAN SECTION    Prognosis: Good    Condition at Discharge: Stable    Rehab Potential (if transferring to Rehab): Good    Recommended Labs or Other Treatments After Discharge: ***    Physician Certification: I certify the above information and transfer of Marshall Gordon  is necessary for the continuing treatment of the diagnosis listed and that she requires {Admit to Appropriate Level of Care:69832} for {GREATER/LESS:088954696} 30 days.      Update Admission H&P: {P DME Changes in ILLFW:[id number]}    PHYSICIAN SIGNATURE:  Joie Holter

## 2019-09-21 PROBLEM — S00.81XA ABRASION OF FACE: Status: ACTIVE | Noted: 2019-09-21

## 2019-09-21 PROCEDURE — 2060000000 HC ICU INTERMEDIATE R&B

## 2019-09-21 PROCEDURE — 6370000000 HC RX 637 (ALT 250 FOR IP): Performed by: INTERNAL MEDICINE

## 2019-09-21 RX ADMIN — ASPIRIN 81 MG: 81 TABLET, COATED ORAL at 08:53

## 2019-09-21 RX ADMIN — MUPIROCIN: 20 OINTMENT TOPICAL at 13:35

## 2019-09-21 RX ADMIN — MUPIROCIN: 20 OINTMENT TOPICAL at 20:21

## 2019-09-21 RX ADMIN — VALSARTAN 80 MG: 80 TABLET, FILM COATED ORAL at 08:53

## 2019-09-21 RX ADMIN — METOPROLOL SUCCINATE 25 MG: 25 TABLET, EXTENDED RELEASE ORAL at 08:54

## 2019-09-21 RX ADMIN — ATORVASTATIN CALCIUM 40 MG: 40 TABLET, FILM COATED ORAL at 20:20

## 2019-09-21 RX ADMIN — FERROUS SULFATE TAB 325 MG (65 MG ELEMENTAL FE) 325 MG: 325 (65 FE) TAB at 20:20

## 2019-09-21 RX ADMIN — LEVOTHYROXINE SODIUM 50 MCG: 50 TABLET ORAL at 06:41

## 2019-09-21 RX ADMIN — VITAMIN D, TAB 1000IU (100/BT) 1000 UNITS: 25 TAB at 13:35

## 2019-09-21 RX ADMIN — FERROUS SULFATE TAB 325 MG (65 MG ELEMENTAL FE) 325 MG: 325 (65 FE) TAB at 08:54

## 2019-09-21 RX ADMIN — AMLODIPINE BESYLATE 5 MG: 5 TABLET ORAL at 08:54

## 2019-09-21 RX ADMIN — ISOSORBIDE MONONITRATE 30 MG: 30 TABLET ORAL at 08:53

## 2019-09-21 RX ADMIN — DONEPEZIL HYDROCHLORIDE 10 MG: 5 TABLET, FILM COATED ORAL at 20:21

## 2019-09-21 ASSESSMENT — PAIN SCALES - GENERAL
PAINLEVEL_OUTOF10: 0

## 2019-09-21 NOTE — PROGRESS NOTES
Admit Date: 9/18/2019      Subjective: Elderly lady with frequent falls. Lives in an assisted living. Admitted for non-ST MI and now medically managed. Right facial abrasions that are now healing  She has dementia     Scheduled Meds:   mupirocin   Topical Daily    Tdap-Dtap  0.5 mL Intramuscular Once    aspirin  81 mg Oral Daily    metoprolol succinate  25 mg Oral Daily    atorvastatin  40 mg Oral Nightly    isosorbide mononitrate  30 mg Oral Daily    amLODIPine  5 mg Oral Daily    donepezil  10 mg Oral Nightly    ferrous sulfate  325 mg Oral BID    levothyroxine  50 mcg Oral Daily    valsartan  80 mg Oral Daily     Continuous Infusions:  PRN Meds:acetaminophen, perflutren lipid microspheres        Objective: Alert and pleasant. States that she ate well     Patient Vitals for the past 8 hrs:   BP Temp Temp src Pulse Resp SpO2   09/21/19 0845 (!) 108/50 97.8 °F (36.6 °C) Temporal 65 18 94 %     I/O last 3 completed shifts: In: 5 [P.O.:420]  Out: 0   No intake/output data recorded.     Physical Exam  HEENT: Right chin and right maxillary area covered with a scab  PULMONARY: Clear to auscultation  CARDIAC: rrr  ABDOMEN: nt  EXTREMITIES: No edema    Data Review: CBC:   Recent Labs     09/18/19  1808   WBC 8.8   RBC 4.03   HGB 12.5   HCT 38.5   MCV 95.5   MCH 31.0   MCHC 32.5   RDW 16.9*      MPV 10.8     CMP:    Recent Labs     09/18/19  1808      K 3.9      CO2 29   BUN 34*   CREATININE 0.9   GFRAA >60   LABGLOM 59   GLUCOSE 111*   CALCIUM 9.4     Lab Results   Component Value Date    WBC 8.8 09/18/2019    WBC 3.9 08/20/2019    HGB 12.5 09/18/2019    HGB 11.3 (L) 08/20/2019    HCT 38.5 09/18/2019    HCT 35.2 (A) 08/20/2019    MCV 95.5 09/18/2019    MCV 93.9 08/20/2019     09/18/2019     08/20/2019     Recent Labs     09/18/19  1808      K 3.9      CO2 29   BUN 34*   CREATININE 0.9   GLUCOSE 111*   CALCIUM 9.4     No results for input(s): OCCULTBLDFEC in the last 72 hours. No results for input(s): CDIFFTOXAB in the last 72 hours. Lab Results   Component Value Date    CKTOTAL 7,354 (H) 09/18/2019    TROPONINI 1.12 (H) 09/18/2019    TROPONINI 1.22 (H) 09/18/2019    TROPONINI <0.01 12/29/2015       Microbiology:   No results for input(s): BC in the last 72 hours. No results for input(s): ORG in the last 72 hours. No results for input(s): [place] in the last 72 hours. No results for input(s): STREPNEUMAGU in the last 72 hours. No results for input(s): LP1UAG in the last 72 hours. No results for input(s): ASO in the last 72 hours. No results for input(s): CULTRESP in the last 72 hours. No results for input(s): LABURIN in the last 72 hours. Assessment:     Principal Problem:    NSTEMI (non-ST elevated myocardial infarction) ([facility]  Active Problems:    Hyperlipemia    Hypothyroid    HTN (hypertension)    Idiopathic scoliosis of thoracolumbar spine    Abrasion of face  Resolved Problems:    * No resolved hospital problems. *      Plan:     Patient input of cardiology  Non-ST MI  Stable  Discussed with staff. Physical therapy can be ordered. She will likely need short-term skilled facility to allow to transition back to assisted living. Dementia is a comorbid diagnoses and elderly age and dementia may significantly limit her eventual options.   Can add vitamin D to see if that would help her gait dysfunction    Unclear about tetanus history so we can give her a TD AP now as well as topical creams for the facial abrasion    Electronically signed by Wade Mcmillan MD on 9/21/2019 at 11:34 AM

## 2019-09-21 NOTE — PLAN OF CARE
Problem: Falls - Risk of:  Goal: Will remain free from falls  Description  Will remain free from falls  9/21/2019 0059 by Iris Buck RN  Outcome: Met This Shift  9/20/2019 1822 by Saud Arriaza RN  Outcome: Met This Shift  9/20/2019 1104 by Sukhwinder Lin RN  Outcome: Met This Shift  Goal: Absence of physical injury  Description  Absence of physical injury  9/21/2019 0059 by Iris Buck RN  Outcome: Met This Shift  9/20/2019 1822 by Saud Arriaza RN  Outcome: Met This Shift  9/20/2019 1104 by Sukhwinder Lin RN  Outcome: Met This Shift     Problem: Risk for Impaired Skin Integrity  Goal: Tissue integrity - skin and mucous membranes  Description  Structural intactness and normal physiological function of skin and  mucous membranes.   9/21/2019 0059 by Iris Buck RN  Outcome: Met This Shift  9/20/2019 1104 by Sukhwinder Lin RN  Outcome: Not Met This Shift     Problem: Discharge Planning:  Goal: Discharged to appropriate level of care  Description  Discharged to appropriate level of care  Outcome: Met This Shift     Problem: Cardiac Output - Decreased:  Goal: Hemodynamic stability will improve  Description  Hemodynamic stability will improve  9/21/2019 0059 by Iris Buck RN  Outcome: Met This Shift  9/20/2019 1822 by Saud Arriaza RN  Outcome: Met This Shift  9/20/2019 1104 by Sukhwinder Lin RN  Outcome: Met This Shift     Problem: Serum Glucose Level - Abnormal:  Goal: Ability to maintain appropriate glucose levels will improve  Description  Ability to maintain appropriate glucose levels will improve  Outcome: Met This Shift     Problem: Pain:  Goal: Pain level will decrease  Description  Pain level will decrease  9/21/2019 0059 by Iris Buck RN  Outcome: Met This Shift  9/20/2019 1822 by Saud Arriaza RN  Outcome: Met This Shift  Goal: Control of acute pain  Description  Control of acute pain  Outcome: Met This Shift  Goal: Control of chronic

## 2019-09-22 PROCEDURE — 6370000000 HC RX 637 (ALT 250 FOR IP): Performed by: INTERNAL MEDICINE

## 2019-09-22 PROCEDURE — 2060000000 HC ICU INTERMEDIATE R&B

## 2019-09-22 PROCEDURE — 99232 SBSQ HOSP IP/OBS MODERATE 35: CPT | Performed by: INTERNAL MEDICINE

## 2019-09-22 RX ADMIN — MUPIROCIN: 20 OINTMENT TOPICAL at 20:32

## 2019-09-22 RX ADMIN — DONEPEZIL HYDROCHLORIDE 10 MG: 5 TABLET, FILM COATED ORAL at 20:31

## 2019-09-22 RX ADMIN — AMLODIPINE BESYLATE 5 MG: 5 TABLET ORAL at 08:37

## 2019-09-22 RX ADMIN — VITAMIN D, TAB 1000IU (100/BT) 1000 UNITS: 25 TAB at 20:31

## 2019-09-22 RX ADMIN — ISOSORBIDE MONONITRATE 30 MG: 30 TABLET ORAL at 08:37

## 2019-09-22 RX ADMIN — FERROUS SULFATE TAB 325 MG (65 MG ELEMENTAL FE) 325 MG: 325 (65 FE) TAB at 20:31

## 2019-09-22 RX ADMIN — VALSARTAN 80 MG: 80 TABLET, FILM COATED ORAL at 08:37

## 2019-09-22 RX ADMIN — VITAMIN D, TAB 1000IU (100/BT) 1000 UNITS: 25 TAB at 08:37

## 2019-09-22 RX ADMIN — FERROUS SULFATE TAB 325 MG (65 MG ELEMENTAL FE) 325 MG: 325 (65 FE) TAB at 08:37

## 2019-09-22 RX ADMIN — ATORVASTATIN CALCIUM 40 MG: 40 TABLET, FILM COATED ORAL at 20:31

## 2019-09-22 RX ADMIN — METOPROLOL SUCCINATE 25 MG: 25 TABLET, EXTENDED RELEASE ORAL at 08:38

## 2019-09-22 RX ADMIN — MUPIROCIN: 20 OINTMENT TOPICAL at 08:36

## 2019-09-22 RX ADMIN — LEVOTHYROXINE SODIUM 50 MCG: 50 TABLET ORAL at 06:47

## 2019-09-22 RX ADMIN — ASPIRIN 81 MG: 81 TABLET, COATED ORAL at 08:37

## 2019-09-22 ASSESSMENT — PAIN SCALES - GENERAL
PAINLEVEL_OUTOF10: 0

## 2019-09-22 NOTE — PROGRESS NOTES
Admit Date: 9/18/2019      Subjective: At this point she is feeling better. Staff is noticed that she is not very flexible and is not gait stable. She would like to return to her own assisted living however this would be based on therapy consult. She is not complaining of chest pain in her face does look better     Scheduled Meds:   enoxaparin  40 mg Subcutaneous Daily    mupirocin   Topical BID    Tdap-Dtap  0.5 mL Intramuscular Prior to discharge    vitamin D  1,000 Units Oral BID    aspirin  81 mg Oral Daily    metoprolol succinate  25 mg Oral Daily    atorvastatin  40 mg Oral Nightly    isosorbide mononitrate  30 mg Oral Daily    amLODIPine  5 mg Oral Daily    donepezil  10 mg Oral Nightly    ferrous sulfate  325 mg Oral BID    levothyroxine  50 mcg Oral Daily    valsartan  80 mg Oral Daily     Continuous Infusions:  PRN Meds:acetaminophen, perflutren lipid microspheres        Objective: Alert. Eating decent     Patient Vitals for the past 8 hrs:   BP Temp Temp src Pulse Resp SpO2   09/22/19 0830 125/74 97.8 °F (36.6 °C) Temporal 75 18 95 %     I/O last 3 completed shifts: In: 480 [P.O.:480]  Out: 300 [Urine:300]  I/O this shift:  In: 240 [P.O.:240]  Out: -     Physical Exam  HEENT: Right chin and right maxillary area covered with a scab  PULMONARY: Clear to auscultation  CARDIAC: rrr  ABDOMEN: nt  EXTREMITIES: No edema      Data Review: CBC: No results for input(s): WBC, RBC, HGB, HCT, MCV, MCH, MCHC, RDW, PLT, MPV in the last 72 hours. CMP:  No results for input(s): NA, K, CL, CO2, BUN, CREATININE, GFRAA, AGRATIO, LABGLOM, GLUCOSE, PROT, LABALBU, CALCIUM, BILITOT, ALKPHOS, AST, ALT in the last 72 hours.     Invalid input(s): GLU  Lab Results   Component Value Date    WBC 8.8 09/18/2019    WBC 3.9 08/20/2019    HGB 12.5 09/18/2019    HGB 11.3 (L) 08/20/2019    HCT 38.5 09/18/2019    HCT 35.2 (A) 08/20/2019    MCV 95.5 09/18/2019    MCV 93.9 08/20/2019     09/18/2019     08/20/2019     No results for input(s): NA, K, CL, CO2, BUN, CREATININE, GLUCOSE, CALCIUM, PROT, LABALBU, BILITOT, ALKPHOS, AST, ALT in the last 72 hours. No results for input(s): OCCULTBLDFEC in the last 72 hours. No results for input(s): CDIFFTOXAB in the last 72 hours. Lab Results   Component Value Date    CKTOTAL 0,473 (H) 09/18/2019    TROPONINI 1.12 (H) 09/18/2019    TROPONINI 1.22 (H) 09/18/2019    TROPONINI <0.01 12/29/2015       Microbiology:   No results for input(s): BC in the last 72 hours. No results for input(s): ORG in the last 72 hours. No results for input(s): Doc Minium in the last 72 hours. No results for input(s): STREPNEUMAGU in the last 72 hours. No results for input(s): LP1UAG in the last 72 hours. No results for input(s): ASO in the last 72 hours. No results for input(s): CULTRESP in the last 72 hours. No results for input(s): LABURIN in the last 72 hours. Assessment:     Principal Problem:    NSTEMI (non-ST elevated myocardial infarction) ([facility] [address]  Active Problems:    Hyperlipemia    Hypothyroid    HTN (hypertension)    Idiopathic scoliosis of thoracolumbar spine    Abrasion of face  Resolved Problems:    * No resolved hospital problems. *      Plan:       Echo reviewed. Her blood pressure is right at the edge I do not believe she would tolerate additional medication. She has been noticed to be friable with her gait. We can officially ask physical therapy. Return to assisted living only if she is able to handle her therapy consult. She may need to go to a skilled facility especially if there is a skilled portion to the assisted living.   disCharge only in the morning        Electronically signed by Montez Schwarz MD on 9/22/2019 at 11:09 AM

## 2019-09-23 VITALS
DIASTOLIC BLOOD PRESSURE: 61 MMHG | HEIGHT: 62 IN | SYSTOLIC BLOOD PRESSURE: 112 MMHG | RESPIRATION RATE: 16 BRPM | BODY MASS INDEX: 18.44 KG/M2 | HEART RATE: 69 BPM | TEMPERATURE: 97.8 F | WEIGHT: 100.2 LBS | OXYGEN SATURATION: 93 %

## 2019-09-23 PROCEDURE — 97530 THERAPEUTIC ACTIVITIES: CPT

## 2019-09-23 PROCEDURE — 6370000000 HC RX 637 (ALT 250 FOR IP): Performed by: INTERNAL MEDICINE

## 2019-09-23 PROCEDURE — 6360000002 HC RX W HCPCS: Performed by: INTERNAL MEDICINE

## 2019-09-23 PROCEDURE — 97535 SELF CARE MNGMENT TRAINING: CPT

## 2019-09-23 RX ORDER — DONEPEZIL HYDROCHLORIDE 10 MG/1
10 TABLET, FILM COATED ORAL NIGHTLY
Qty: 90 TABLET | Refills: 1 | DISCHARGE
Start: 2019-09-23

## 2019-09-23 RX ORDER — FERROUS SULFATE 325(65) MG
325 TABLET ORAL 2 TIMES DAILY
Qty: 60 TABLET | Refills: 1 | DISCHARGE
Start: 2019-09-23

## 2019-09-23 RX ORDER — ISOSORBIDE MONONITRATE 30 MG/1
30 TABLET, EXTENDED RELEASE ORAL DAILY
Qty: 30 TABLET | Refills: 3 | DISCHARGE
Start: 2019-09-24

## 2019-09-23 RX ORDER — ASPIRIN 81 MG/1
81 TABLET ORAL DAILY
Qty: 30 TABLET | Refills: 3 | DISCHARGE
Start: 2019-09-24

## 2019-09-23 RX ORDER — ATORVASTATIN CALCIUM 40 MG/1
40 TABLET, FILM COATED ORAL NIGHTLY
Qty: 30 TABLET | Refills: 3 | DISCHARGE
Start: 2019-09-23

## 2019-09-23 RX ORDER — METOPROLOL SUCCINATE 25 MG/1
25 TABLET, EXTENDED RELEASE ORAL DAILY
Qty: 30 TABLET | Refills: 3 | Status: ON HOLD | DISCHARGE
Start: 2019-09-24 | End: 2019-12-22 | Stop reason: HOSPADM

## 2019-09-23 RX ADMIN — VITAMIN D, TAB 1000IU (100/BT) 1000 UNITS: 25 TAB at 08:41

## 2019-09-23 RX ADMIN — AMLODIPINE BESYLATE 5 MG: 5 TABLET ORAL at 08:41

## 2019-09-23 RX ADMIN — FERROUS SULFATE TAB 325 MG (65 MG ELEMENTAL FE) 325 MG: 325 (65 FE) TAB at 08:41

## 2019-09-23 RX ADMIN — ISOSORBIDE MONONITRATE 30 MG: 30 TABLET ORAL at 08:41

## 2019-09-23 RX ADMIN — METOPROLOL SUCCINATE 25 MG: 25 TABLET, EXTENDED RELEASE ORAL at 08:41

## 2019-09-23 RX ADMIN — ENOXAPARIN SODIUM 30 MG: 30 INJECTION SUBCUTANEOUS at 08:42

## 2019-09-23 RX ADMIN — VALSARTAN 80 MG: 80 TABLET, FILM COATED ORAL at 08:41

## 2019-09-23 RX ADMIN — LEVOTHYROXINE SODIUM 50 MCG: 50 TABLET ORAL at 06:31

## 2019-09-23 RX ADMIN — MUPIROCIN: 20 OINTMENT TOPICAL at 08:41

## 2019-09-23 RX ADMIN — ASPIRIN 81 MG: 81 TABLET, COATED ORAL at 08:41

## 2019-09-23 ASSESSMENT — PAIN SCALES - GENERAL
PAINLEVEL_OUTOF10: 0
PAINLEVEL_OUTOF10: 0

## 2019-09-23 NOTE — CARE COORDINATION
[facility] to pickup at 5pm to discharge to [facility]. Notified [address] Hinton, Charge Nurse and [address]ricks.  [id number] To [facility]

## 2019-09-23 NOTE — PROGRESS NOTES
Date    WBC 8.8 09/18/2019    RBC 4.03 09/18/2019    HGB 12.5 09/18/2019    HCT 38.5 09/18/2019    MCV 95.5 09/18/2019    MCH 31.0 09/18/2019    MCHC 32.5 09/18/2019    RDW 16.9 09/18/2019     09/18/2019    MPV 10.8 09/18/2019     BMP:   Lab Results   Component Value Date     09/18/2019    K 3.9 09/18/2019     09/18/2019    CO2 29 09/18/2019    BUN 34 09/18/2019    LABALBU 3.8 06/28/2019    CREATININE 0.9 09/18/2019    CALCIUM 9.4 09/18/2019    GFRAA >60 09/18/2019    LABGLOM 59 09/18/2019     Magnesium:    Lab Results   Component Value Date    MG 2.2 09/18/2019     Cardiac Enzymes:   Lab Results   Component Value Date    CKTOTAL 7,773 (H) 09/18/2019    TROPONINI 1.12 (H) 09/18/2019    TROPONINI 1.22 (H) 09/18/2019    TROPONINI <0.01 12/29/2015      PT/INR:  No results found for: PROTIME, INR  TSH:    Lab Results   Component Value Date    TSH 6.220 09/18/2019     Rhythm Strip: normal sinus rhythm. ASSESSMENT & PLAN:    Patient Active Problem List   Diagnosis    Hyperlipemia    Hypothyroid    HTN (hypertension)    Idiopathic scoliosis of thoracolumbar spine    Spondylosis of lumbar region without myelopathy or radiculopathy    NSTEMI (non-ST elevated myocardial infarction) (HCC)    Abrasion of face     1. ACS/NSTEMI:    Patient desires conservative treatment, which is very reasonable. Lovenox x 48 hours (until am) - completed last week    Echo to guide therapy pending. ASA/statin/BB/imdur. 2. HTN: Observe. 3. Lipids: Statin. 4. Hypothyroid. Adama Stark M.D.  [address] [facility]  Heart Failure and Pulmonary Hypertension  Mobile [phone]

## 2019-09-23 NOTE — PROGRESS NOTES
Nurse to nurse report given to [address] [place] [address] at [facility] ([phone]). ALEXANDREA and med report faxed to [phone].

## 2019-09-23 NOTE — PROGRESS NOTES
Occupational Therapy  OT BEDSIDE TREATMENT NOTE      Date:2019  Patient Name: Edith Leger  MRN: [id number]  : 1929  Room: [address] [place] [address]     Evaluating OT:  MILTON Sunshine, OTR/L #[id number]        AM-PAC Daily Activity Raw Score:  15/24  Recommended Adaptive Equipment:  TBD - consider FWW per PT, Adaptive equipment for LB dressing/Item Retrieval, BSC     Reason for Admission:  Pt was admitted after being found on the floor in her Apartment     Diagnosis:  NSTEMI, Non-Traumatic Rhabdomyolysis       Procedures this admission:  None      Pertinent Medical History:  Chronic Back Pain, HTN      Precautions:  Falls  Cardiac Diet     Home Living: Pt lives alone in a single-level apartment ([facility]) with level entry. Bathroom setup:  Walk-in Shower w/ Built-in Seat, High commode, Grab bars throughout   Equipment owned:  [facility]     Available Family Assist:  None     Prior Level of Function:  IND with ADLs, IADLs (does own laundry, makes own meals, facility cleans her apt), Transfers and Mobility using SPC for ambulation. Active - regular outings  Driving:  No  Occupation:  Retired from [employer] at [email or web address]     Pain Level:  No pain reported at this time.   Additional Complaints:  General Weakness     Cognition: A & O x 4   Able to Follow Multi-step Commands w/ Min VCs              Memory:  good (-)              Sequencing:  good (-)              Problem solving:  good  (-)              Judgement/safety:  good (-)  Additional Comments:  Pt was pleasant, cooperative.                  Functional Assessment:    Initial Eval Status  Date: 19 Treatment Status  Date: 19 Short Term Goals  Treatment frequency: PRN 2-4 x/week   Feeding SUP/Set up     Per report  Set up       Grooming Min A/Set up     Required Min A to comb hair posterior aspect of hair d/t limited ROM R Shoulder - seated EOB, unable to ambulate to/stand for task Set up  To comb hair and brush teeth while seated  Mod A for

## 2019-09-24 PROBLEM — I21.9 ACUTE MYOCARDIAL INFARCTION (HCC): Status: ACTIVE | Noted: 2019-09-24

## 2019-12-21 ENCOUNTER — APPOINTMENT (OUTPATIENT)
Dept: GENERAL RADIOLOGY | Age: 84
End: 2019-12-21
Payer: MEDICARE

## 2019-12-21 ENCOUNTER — APPOINTMENT (OUTPATIENT)
Dept: CT IMAGING | Age: 84
End: 2019-12-21
Payer: MEDICARE

## 2019-12-21 ENCOUNTER — HOSPITAL ENCOUNTER (OUTPATIENT)
Age: 84
Setting detail: OBSERVATION
Discharge: HOSPICE | End: 2019-12-24
Attending: EMERGENCY MEDICINE | Admitting: INTERNAL MEDICINE
Payer: MEDICARE

## 2019-12-21 DIAGNOSIS — I48.91 ATRIAL FIBRILLATION WITH RVR (HCC): Primary | ICD-10-CM

## 2019-12-21 PROBLEM — R53.1 WEAKNESS: Status: ACTIVE | Noted: 2019-12-21

## 2019-12-21 LAB
ALBUMIN SERPL-MCNC: 3.2 G/DL (ref 3.5–5.2)
ALP BLD-CCNC: 64 U/L (ref 35–104)
ALT SERPL-CCNC: 14 U/L (ref 0–32)
ANION GAP SERPL CALCULATED.3IONS-SCNC: 16 MMOL/L (ref 7–16)
ANISOCYTOSIS: ABNORMAL
APTT: 28.3 SEC (ref 24.5–35.1)
AST SERPL-CCNC: 36 U/L (ref 0–31)
BACTERIA: ABNORMAL /HPF
BASOPHILS ABSOLUTE: 0.03 E9/L (ref 0–0.2)
BASOPHILS RELATIVE PERCENT: 0.4 % (ref 0–2)
BILIRUB SERPL-MCNC: 0.5 MG/DL (ref 0–1.2)
BILIRUBIN URINE: NEGATIVE
BLOOD, URINE: NEGATIVE
BUN BLDV-MCNC: 30 MG/DL (ref 8–23)
BURR CELLS: ABNORMAL
CALCIUM SERPL-MCNC: 9.2 MG/DL (ref 8.6–10.2)
CHLORIDE BLD-SCNC: 96 MMOL/L (ref 98–107)
CLARITY: CLEAR
CO2: 24 MMOL/L (ref 22–29)
COLOR: YELLOW
CREAT SERPL-MCNC: 0.9 MG/DL (ref 0.5–1)
EKG ATRIAL RATE: 125 BPM
EKG Q-T INTERVAL: 288 MS
EKG QRS DURATION: 130 MS
EKG QTC CALCULATION (BAZETT): 408 MS
EKG R AXIS: 105 DEGREES
EKG T AXIS: -63 DEGREES
EKG VENTRICULAR RATE: 121 BPM
EOSINOPHILS ABSOLUTE: 0 E9/L (ref 0.05–0.5)
EOSINOPHILS RELATIVE PERCENT: 0 % (ref 0–6)
GFR AFRICAN AMERICAN: >60
GFR NON-AFRICAN AMERICAN: 59 ML/MIN/1.73
GLUCOSE BLD-MCNC: 198 MG/DL (ref 74–99)
GLUCOSE URINE: NEGATIVE MG/DL
HCT VFR BLD CALC: 38 % (ref 34–48)
HEMOGLOBIN: 12.1 G/DL (ref 11.5–15.5)
IMMATURE GRANULOCYTES #: 0.05 E9/L
IMMATURE GRANULOCYTES %: 0.6 % (ref 0–5)
INR BLD: 1.2
KETONES, URINE: NEGATIVE MG/DL
LACTIC ACID: 3.1 MMOL/L (ref 0.5–2.2)
LEUKOCYTE ESTERASE, URINE: NEGATIVE
LYMPHOCYTES ABSOLUTE: 0.31 E9/L (ref 1.5–4)
LYMPHOCYTES RELATIVE PERCENT: 3.8 % (ref 20–42)
MAGNESIUM: 2 MG/DL (ref 1.6–2.6)
MCH RBC QN AUTO: 30.4 PG (ref 26–35)
MCHC RBC AUTO-ENTMCNC: 31.8 % (ref 32–34.5)
MCV RBC AUTO: 95.5 FL (ref 80–99.9)
MONOCYTES ABSOLUTE: 0.61 E9/L (ref 0.1–0.95)
MONOCYTES RELATIVE PERCENT: 7.5 % (ref 2–12)
NEUTROPHILS ABSOLUTE: 7.18 E9/L (ref 1.8–7.3)
NEUTROPHILS RELATIVE PERCENT: 87.7 % (ref 43–80)
NITRITE, URINE: NEGATIVE
OVALOCYTES: ABNORMAL
PDW BLD-RTO: 14.6 FL (ref 11.5–15)
PH UA: 7 (ref 5–9)
PLATELET # BLD: 257 E9/L (ref 130–450)
PMV BLD AUTO: 10.6 FL (ref 7–12)
POIKILOCYTES: ABNORMAL
POTASSIUM SERPL-SCNC: 4.2 MMOL/L (ref 3.5–5)
PRO-BNP: 3019 PG/ML (ref 0–450)
PROTEIN UA: 30 MG/DL
PROTHROMBIN TIME: 12.9 SEC (ref 9.3–12.4)
RBC # BLD: 3.98 E12/L (ref 3.5–5.5)
RBC UA: ABNORMAL /HPF (ref 0–2)
SCHISTOCYTES: ABNORMAL
SODIUM BLD-SCNC: 136 MMOL/L (ref 132–146)
SPECIFIC GRAVITY UA: 1.01 (ref 1–1.03)
T4 FREE: 1.42 NG/DL (ref 0.93–1.7)
TOTAL PROTEIN: 7.7 G/DL (ref 6.4–8.3)
TROPONIN: <0.01 NG/ML (ref 0–0.03)
TSH SERPL DL<=0.05 MIU/L-ACNC: 4.35 UIU/ML (ref 0.27–4.2)
UROBILINOGEN, URINE: 0.2 E.U./DL
WBC # BLD: 8.2 E9/L (ref 4.5–11.5)
WBC UA: ABNORMAL /HPF (ref 0–5)

## 2019-12-21 PROCEDURE — 2060000000 HC ICU INTERMEDIATE R&B

## 2019-12-21 PROCEDURE — 84484 ASSAY OF TROPONIN QUANT: CPT

## 2019-12-21 PROCEDURE — 96361 HYDRATE IV INFUSION ADD-ON: CPT

## 2019-12-21 PROCEDURE — 87088 URINE BACTERIA CULTURE: CPT

## 2019-12-21 PROCEDURE — 85025 COMPLETE CBC W/AUTO DIFF WBC: CPT

## 2019-12-21 PROCEDURE — 80053 COMPREHEN METABOLIC PANEL: CPT

## 2019-12-21 PROCEDURE — 93005 ELECTROCARDIOGRAM TRACING: CPT | Performed by: INTERNAL MEDICINE

## 2019-12-21 PROCEDURE — 6370000000 HC RX 637 (ALT 250 FOR IP): Performed by: INTERNAL MEDICINE

## 2019-12-21 PROCEDURE — 84439 ASSAY OF FREE THYROXINE: CPT

## 2019-12-21 PROCEDURE — 93005 ELECTROCARDIOGRAM TRACING: CPT | Performed by: STUDENT IN AN ORGANIZED HEALTH CARE EDUCATION/TRAINING PROGRAM

## 2019-12-21 PROCEDURE — 87040 BLOOD CULTURE FOR BACTERIA: CPT

## 2019-12-21 PROCEDURE — 2580000003 HC RX 258: Performed by: INTERNAL MEDICINE

## 2019-12-21 PROCEDURE — 70450 CT HEAD/BRAIN W/O DYE: CPT

## 2019-12-21 PROCEDURE — 85610 PROTHROMBIN TIME: CPT

## 2019-12-21 PROCEDURE — 71045 X-RAY EXAM CHEST 1 VIEW: CPT

## 2019-12-21 PROCEDURE — 96360 HYDRATION IV INFUSION INIT: CPT

## 2019-12-21 PROCEDURE — 85730 THROMBOPLASTIN TIME PARTIAL: CPT

## 2019-12-21 PROCEDURE — 36415 COLL VENOUS BLD VENIPUNCTURE: CPT

## 2019-12-21 PROCEDURE — 99285 EMERGENCY DEPT VISIT HI MDM: CPT

## 2019-12-21 PROCEDURE — 83880 ASSAY OF NATRIURETIC PEPTIDE: CPT

## 2019-12-21 PROCEDURE — 84443 ASSAY THYROID STIM HORMONE: CPT

## 2019-12-21 PROCEDURE — 83735 ASSAY OF MAGNESIUM: CPT

## 2019-12-21 PROCEDURE — 93010 ELECTROCARDIOGRAM REPORT: CPT | Performed by: INTERNAL MEDICINE

## 2019-12-21 PROCEDURE — G0378 HOSPITAL OBSERVATION PER HR: HCPCS

## 2019-12-21 PROCEDURE — 83605 ASSAY OF LACTIC ACID: CPT

## 2019-12-21 PROCEDURE — 2580000003 HC RX 258: Performed by: STUDENT IN AN ORGANIZED HEALTH CARE EDUCATION/TRAINING PROGRAM

## 2019-12-21 PROCEDURE — APPSS45 APP SPLIT SHARED TIME 31-45 MINUTES: Performed by: PHYSICIAN ASSISTANT

## 2019-12-21 PROCEDURE — 81001 URINALYSIS AUTO W/SCOPE: CPT

## 2019-12-21 PROCEDURE — 6370000000 HC RX 637 (ALT 250 FOR IP): Performed by: STUDENT IN AN ORGANIZED HEALTH CARE EDUCATION/TRAINING PROGRAM

## 2019-12-21 RX ORDER — VITAMIN B COMPLEX
1 CAPSULE ORAL EVERY MORNING
COMMUNITY

## 2019-12-21 RX ORDER — FERROUS SULFATE 325(65) MG
325 TABLET ORAL 2 TIMES DAILY
Status: DISCONTINUED | OUTPATIENT
Start: 2019-12-21 | End: 2019-12-24 | Stop reason: HOSPADM

## 2019-12-21 RX ORDER — 0.9 % SODIUM CHLORIDE 0.9 %
500 INTRAVENOUS SOLUTION INTRAVENOUS ONCE
Status: COMPLETED | OUTPATIENT
Start: 2019-12-21 | End: 2019-12-21

## 2019-12-21 RX ORDER — OYSTER SHELL CALCIUM WITH VITAMIN D 500; 200 MG/1; [IU]/1
1 TABLET, FILM COATED ORAL 2 TIMES DAILY
Status: DISCONTINUED | OUTPATIENT
Start: 2019-12-21 | End: 2019-12-24 | Stop reason: HOSPADM

## 2019-12-21 RX ORDER — LOSARTAN POTASSIUM 25 MG/1
25 TABLET ORAL DAILY
Status: DISCONTINUED | OUTPATIENT
Start: 2019-12-22 | End: 2019-12-24 | Stop reason: HOSPADM

## 2019-12-21 RX ORDER — ATORVASTATIN CALCIUM 40 MG/1
40 TABLET, FILM COATED ORAL NIGHTLY
Status: DISCONTINUED | OUTPATIENT
Start: 2019-12-21 | End: 2019-12-24 | Stop reason: HOSPADM

## 2019-12-21 RX ORDER — SODIUM CHLORIDE 9 MG/ML
INJECTION, SOLUTION INTRAVENOUS CONTINUOUS
Status: DISCONTINUED | OUTPATIENT
Start: 2019-12-21 | End: 2019-12-22

## 2019-12-21 RX ORDER — CHOLECALCIFEROL (VITAMIN D3) 10 MCG
1 TABLET ORAL EVERY MORNING
Status: DISCONTINUED | OUTPATIENT
Start: 2019-12-22 | End: 2019-12-24 | Stop reason: HOSPADM

## 2019-12-21 RX ORDER — ISOSORBIDE MONONITRATE 30 MG/1
30 TABLET, EXTENDED RELEASE ORAL DAILY
Status: DISCONTINUED | OUTPATIENT
Start: 2019-12-21 | End: 2019-12-24 | Stop reason: HOSPADM

## 2019-12-21 RX ORDER — DONEPEZIL HYDROCHLORIDE 5 MG/1
10 TABLET, FILM COATED ORAL NIGHTLY
Status: DISCONTINUED | OUTPATIENT
Start: 2019-12-21 | End: 2019-12-24 | Stop reason: HOSPADM

## 2019-12-21 RX ORDER — METOPROLOL SUCCINATE 50 MG/1
50 TABLET, EXTENDED RELEASE ORAL DAILY
Status: DISCONTINUED | OUTPATIENT
Start: 2019-12-22 | End: 2019-12-24 | Stop reason: HOSPADM

## 2019-12-21 RX ORDER — ASPIRIN 81 MG/1
324 TABLET, CHEWABLE ORAL ONCE
Status: COMPLETED | OUTPATIENT
Start: 2019-12-21 | End: 2019-12-21

## 2019-12-21 RX ORDER — POTASSIUM CITRATE 5 MEQ/1
10 TABLET, EXTENDED RELEASE ORAL DAILY
Status: DISCONTINUED | OUTPATIENT
Start: 2019-12-21 | End: 2019-12-24 | Stop reason: HOSPADM

## 2019-12-21 RX ORDER — LOSARTAN POTASSIUM 50 MG/1
50 TABLET ORAL DAILY
Status: DISCONTINUED | OUTPATIENT
Start: 2019-12-21 | End: 2019-12-21

## 2019-12-21 RX ORDER — CHLORAL HYDRATE 500 MG
1000 CAPSULE ORAL DAILY
Status: DISCONTINUED | OUTPATIENT
Start: 2019-12-21 | End: 2019-12-21 | Stop reason: CLARIF

## 2019-12-21 RX ORDER — LACTOBACILLUS RHAMNOSUS GG 10B CELL
1 CAPSULE ORAL DAILY
Status: DISCONTINUED | OUTPATIENT
Start: 2019-12-21 | End: 2019-12-24 | Stop reason: HOSPADM

## 2019-12-21 RX ORDER — POTASSIUM CHLORIDE 750 MG/1
10 CAPSULE, EXTENDED RELEASE ORAL DAILY
COMMUNITY

## 2019-12-21 RX ORDER — ASPIRIN 81 MG/1
81 TABLET ORAL DAILY
Status: DISCONTINUED | OUTPATIENT
Start: 2019-12-21 | End: 2019-12-21

## 2019-12-21 RX ORDER — METOPROLOL SUCCINATE 25 MG/1
25 TABLET, EXTENDED RELEASE ORAL DAILY
Status: DISCONTINUED | OUTPATIENT
Start: 2019-12-21 | End: 2019-12-21

## 2019-12-21 RX ORDER — LEVOTHYROXINE SODIUM 0.05 MG/1
50 TABLET ORAL DAILY
Status: DISCONTINUED | OUTPATIENT
Start: 2019-12-21 | End: 2019-12-24 | Stop reason: HOSPADM

## 2019-12-21 RX ORDER — LOSARTAN POTASSIUM 50 MG/1
50 TABLET ORAL DAILY
Status: ON HOLD | COMMUNITY
End: 2019-12-22 | Stop reason: HOSPADM

## 2019-12-21 RX ADMIN — FERROUS SULFATE TAB 325 MG (65 MG ELEMENTAL FE) 325 MG: 325 (65 FE) TAB at 16:07

## 2019-12-21 RX ADMIN — SODIUM CHLORIDE 500 ML: 9 INJECTION, SOLUTION INTRAVENOUS at 11:20

## 2019-12-21 RX ADMIN — ASPIRIN 81 MG 324 MG: 81 TABLET ORAL at 11:38

## 2019-12-21 RX ADMIN — Medication 1 CAPSULE: at 16:07

## 2019-12-21 RX ADMIN — ATORVASTATIN CALCIUM 40 MG: 40 TABLET, FILM COATED ORAL at 23:16

## 2019-12-21 RX ADMIN — LOSARTAN POTASSIUM 50 MG: 50 TABLET, FILM COATED ORAL at 16:07

## 2019-12-21 RX ADMIN — METOPROLOL SUCCINATE 25 MG: 25 TABLET, EXTENDED RELEASE ORAL at 16:07

## 2019-12-21 RX ADMIN — APIXABAN 2.5 MG: 2.5 TABLET, FILM COATED ORAL at 23:16

## 2019-12-21 RX ADMIN — DONEPEZIL HYDROCHLORIDE 10 MG: 5 TABLET, FILM COATED ORAL at 23:16

## 2019-12-21 RX ADMIN — SODIUM CHLORIDE: 9 INJECTION, SOLUTION INTRAVENOUS at 15:53

## 2019-12-21 RX ADMIN — FERROUS SULFATE TAB 325 MG (65 MG ELEMENTAL FE) 325 MG: 325 (65 FE) TAB at 23:16

## 2019-12-21 RX ADMIN — ASPIRIN 81 MG: 81 TABLET, COATED ORAL at 16:07

## 2019-12-21 RX ADMIN — LEVOTHYROXINE SODIUM 50 MCG: 50 TABLET ORAL at 16:07

## 2019-12-21 RX ADMIN — ISOSORBIDE MONONITRATE 30 MG: 30 TABLET ORAL at 16:07

## 2019-12-21 RX ADMIN — CALCIUM CARBONATE-VITAMIN D TAB 500 MG-200 UNIT 1 TABLET: 500-200 TAB at 23:15

## 2019-12-21 RX ADMIN — CALCIUM CARBONATE-VITAMIN D TAB 500 MG-200 UNIT 1 TABLET: 500-200 TAB at 16:07

## 2019-12-21 RX ADMIN — POTASSIUM CITRATE 10 MEQ: 5 TABLET ORAL at 16:07

## 2019-12-21 ASSESSMENT — PAIN SCALES - GENERAL
PAINLEVEL_OUTOF10: 0

## 2019-12-21 ASSESSMENT — ENCOUNTER SYMPTOMS
SHORTNESS OF BREATH: 0
DIARRHEA: 0
SORE THROAT: 0
COLOR CHANGE: 0
COUGH: 0
TROUBLE SWALLOWING: 0
VOMITING: 0
ABDOMINAL PAIN: 0
BACK PAIN: 0
NAUSEA: 0
VOICE CHANGE: 0

## 2019-12-22 PROBLEM — I48.91 ATRIAL FIBRILLATION (HCC): Status: ACTIVE | Noted: 2019-12-22

## 2019-12-22 LAB
EKG ATRIAL RATE: 70 BPM
EKG P AXIS: 30 DEGREES
EKG P-R INTERVAL: 150 MS
EKG Q-T INTERVAL: 418 MS
EKG QRS DURATION: 134 MS
EKG QTC CALCULATION (BAZETT): 451 MS
EKG R AXIS: 93 DEGREES
EKG T AXIS: -9 DEGREES
EKG VENTRICULAR RATE: 70 BPM

## 2019-12-22 PROCEDURE — 6370000000 HC RX 637 (ALT 250 FOR IP): Performed by: INTERNAL MEDICINE

## 2019-12-22 PROCEDURE — 93010 ELECTROCARDIOGRAM REPORT: CPT | Performed by: INTERNAL MEDICINE

## 2019-12-22 PROCEDURE — G0378 HOSPITAL OBSERVATION PER HR: HCPCS

## 2019-12-22 PROCEDURE — 2060000000 HC ICU INTERMEDIATE R&B

## 2019-12-22 RX ORDER — POTASSIUM CITRATE 5 MEQ/1
10 TABLET, EXTENDED RELEASE ORAL DAILY
Qty: 30 TABLET | Refills: 1 | Status: SHIPPED | OUTPATIENT
Start: 2019-12-23

## 2019-12-22 RX ORDER — METOPROLOL SUCCINATE 50 MG/1
50 TABLET, EXTENDED RELEASE ORAL DAILY
Qty: 30 TABLET | Refills: 3 | Status: SHIPPED | OUTPATIENT
Start: 2019-12-23

## 2019-12-22 RX ORDER — LOSARTAN POTASSIUM 25 MG/1
25 TABLET ORAL DAILY
Qty: 30 TABLET | Refills: 3 | Status: SHIPPED | OUTPATIENT
Start: 2019-12-23

## 2019-12-22 RX ADMIN — LOSARTAN POTASSIUM 25 MG: 25 TABLET, FILM COATED ORAL at 08:44

## 2019-12-22 RX ADMIN — CALCIUM CARBONATE-VITAMIN D TAB 500 MG-200 UNIT 1 TABLET: 500-200 TAB at 22:01

## 2019-12-22 RX ADMIN — ATORVASTATIN CALCIUM 40 MG: 40 TABLET, FILM COATED ORAL at 22:00

## 2019-12-22 RX ADMIN — ISOSORBIDE MONONITRATE 30 MG: 30 TABLET ORAL at 08:45

## 2019-12-22 RX ADMIN — FERROUS SULFATE TAB 325 MG (65 MG ELEMENTAL FE) 325 MG: 325 (65 FE) TAB at 08:44

## 2019-12-22 RX ADMIN — DONEPEZIL HYDROCHLORIDE 10 MG: 5 TABLET, FILM COATED ORAL at 22:01

## 2019-12-22 RX ADMIN — METOPROLOL SUCCINATE 50 MG: 50 TABLET, EXTENDED RELEASE ORAL at 08:44

## 2019-12-22 RX ADMIN — FERROUS SULFATE TAB 325 MG (65 MG ELEMENTAL FE) 325 MG: 325 (65 FE) TAB at 22:01

## 2019-12-22 RX ADMIN — CALCIUM CARBONATE-VITAMIN D TAB 500 MG-200 UNIT 1 TABLET: 500-200 TAB at 08:45

## 2019-12-22 RX ADMIN — POTASSIUM CITRATE 10 MEQ: 5 TABLET ORAL at 08:45

## 2019-12-22 RX ADMIN — APIXABAN 2.5 MG: 2.5 TABLET, FILM COATED ORAL at 22:02

## 2019-12-22 RX ADMIN — APIXABAN 2.5 MG: 2.5 TABLET, FILM COATED ORAL at 08:44

## 2019-12-22 RX ADMIN — Medication 1 CAPSULE: at 08:44

## 2019-12-22 RX ADMIN — LEVOTHYROXINE SODIUM 50 MCG: 50 TABLET ORAL at 08:45

## 2019-12-22 RX ADMIN — NEPHROCAP 1 MG: 1 CAP ORAL at 08:45

## 2019-12-22 ASSESSMENT — PAIN SCALES - GENERAL
PAINLEVEL_OUTOF10: 0

## 2019-12-23 LAB — URINE CULTURE, ROUTINE: NORMAL

## 2019-12-23 PROCEDURE — 6370000000 HC RX 637 (ALT 250 FOR IP): Performed by: INTERNAL MEDICINE

## 2019-12-23 PROCEDURE — 97162 PT EVAL MOD COMPLEX 30 MIN: CPT

## 2019-12-23 PROCEDURE — G0378 HOSPITAL OBSERVATION PER HR: HCPCS

## 2019-12-23 PROCEDURE — 97530 THERAPEUTIC ACTIVITIES: CPT

## 2019-12-23 RX ADMIN — FERROUS SULFATE TAB 325 MG (65 MG ELEMENTAL FE) 325 MG: 325 (65 FE) TAB at 09:47

## 2019-12-23 RX ADMIN — Medication 1 CAPSULE: at 09:47

## 2019-12-23 RX ADMIN — LOSARTAN POTASSIUM 25 MG: 25 TABLET, FILM COATED ORAL at 09:47

## 2019-12-23 RX ADMIN — CALCIUM CARBONATE-VITAMIN D TAB 500 MG-200 UNIT 1 TABLET: 500-200 TAB at 09:48

## 2019-12-23 RX ADMIN — ISOSORBIDE MONONITRATE 30 MG: 30 TABLET ORAL at 09:47

## 2019-12-23 RX ADMIN — METOPROLOL SUCCINATE 50 MG: 50 TABLET, EXTENDED RELEASE ORAL at 09:48

## 2019-12-23 RX ADMIN — POTASSIUM CITRATE 10 MEQ: 5 TABLET ORAL at 09:47

## 2019-12-23 RX ADMIN — LEVOTHYROXINE SODIUM 50 MCG: 50 TABLET ORAL at 09:48

## 2019-12-23 RX ADMIN — APIXABAN 2.5 MG: 2.5 TABLET, FILM COATED ORAL at 09:47

## 2019-12-23 RX ADMIN — NEPHROCAP 1 MG: 1 CAP ORAL at 09:48

## 2019-12-23 ASSESSMENT — PAIN SCALES - GENERAL
PAINLEVEL_OUTOF10: 0

## 2019-12-24 VITALS
BODY MASS INDEX: 25.19 KG/M2 | OXYGEN SATURATION: 93 % | DIASTOLIC BLOOD PRESSURE: 60 MMHG | HEIGHT: 58 IN | TEMPERATURE: 98.8 F | HEART RATE: 93 BPM | SYSTOLIC BLOOD PRESSURE: 130 MMHG | RESPIRATION RATE: 14 BRPM | WEIGHT: 120 LBS

## 2019-12-24 LAB
ALBUMIN SERPL-MCNC: 2.5 G/DL (ref 3.5–5.2)
ALP BLD-CCNC: 58 U/L (ref 35–104)
ALT SERPL-CCNC: 11 U/L (ref 0–32)
ANION GAP SERPL CALCULATED.3IONS-SCNC: 18 MMOL/L (ref 7–16)
AST SERPL-CCNC: 27 U/L (ref 0–31)
BASOPHILS ABSOLUTE: 0.04 E9/L (ref 0–0.2)
BASOPHILS RELATIVE PERCENT: 0.4 % (ref 0–2)
BILIRUB SERPL-MCNC: 0.6 MG/DL (ref 0–1.2)
BUN BLDV-MCNC: 35 MG/DL (ref 8–23)
CALCIUM SERPL-MCNC: 9.2 MG/DL (ref 8.6–10.2)
CHLORIDE BLD-SCNC: 101 MMOL/L (ref 98–107)
CO2: 19 MMOL/L (ref 22–29)
CREAT SERPL-MCNC: 0.7 MG/DL (ref 0.5–1)
EOSINOPHILS ABSOLUTE: 0 E9/L (ref 0.05–0.5)
EOSINOPHILS RELATIVE PERCENT: 0 % (ref 0–6)
GFR AFRICAN AMERICAN: >60
GFR NON-AFRICAN AMERICAN: >60 ML/MIN/1.73
GLUCOSE BLD-MCNC: 73 MG/DL (ref 74–99)
HCT VFR BLD CALC: 33 % (ref 34–48)
HEMOGLOBIN: 10.9 G/DL (ref 11.5–15.5)
IMMATURE GRANULOCYTES #: 0.11 E9/L
IMMATURE GRANULOCYTES %: 1 % (ref 0–5)
LYMPHOCYTES ABSOLUTE: 0.66 E9/L (ref 1.5–4)
LYMPHOCYTES RELATIVE PERCENT: 5.8 % (ref 20–42)
MCH RBC QN AUTO: 30.8 PG (ref 26–35)
MCHC RBC AUTO-ENTMCNC: 33 % (ref 32–34.5)
MCV RBC AUTO: 93.2 FL (ref 80–99.9)
MONOCYTES ABSOLUTE: 0.94 E9/L (ref 0.1–0.95)
MONOCYTES RELATIVE PERCENT: 8.3 % (ref 2–12)
NEUTROPHILS ABSOLUTE: 9.56 E9/L (ref 1.8–7.3)
NEUTROPHILS RELATIVE PERCENT: 84.5 % (ref 43–80)
PDW BLD-RTO: 14.8 FL (ref 11.5–15)
PLATELET # BLD: 328 E9/L (ref 130–450)
PMV BLD AUTO: 10.6 FL (ref 7–12)
POTASSIUM SERPL-SCNC: 3.8 MMOL/L (ref 3.5–5)
RBC # BLD: 3.54 E12/L (ref 3.5–5.5)
SODIUM BLD-SCNC: 138 MMOL/L (ref 132–146)
TOTAL PROTEIN: 6.4 G/DL (ref 6.4–8.3)
WBC # BLD: 11.3 E9/L (ref 4.5–11.5)

## 2019-12-24 PROCEDURE — 85025 COMPLETE CBC W/AUTO DIFF WBC: CPT

## 2019-12-24 PROCEDURE — 80053 COMPREHEN METABOLIC PANEL: CPT

## 2019-12-24 PROCEDURE — 6370000000 HC RX 637 (ALT 250 FOR IP): Performed by: INTERNAL MEDICINE

## 2019-12-24 PROCEDURE — 97165 OT EVAL LOW COMPLEX 30 MIN: CPT

## 2019-12-24 PROCEDURE — G0378 HOSPITAL OBSERVATION PER HR: HCPCS

## 2019-12-24 PROCEDURE — 97530 THERAPEUTIC ACTIVITIES: CPT

## 2019-12-24 PROCEDURE — 36415 COLL VENOUS BLD VENIPUNCTURE: CPT

## 2019-12-24 RX ADMIN — CALCIUM CARBONATE-VITAMIN D TAB 500 MG-200 UNIT 1 TABLET: 500-200 TAB at 00:04

## 2019-12-24 RX ADMIN — APIXABAN 2.5 MG: 2.5 TABLET, FILM COATED ORAL at 09:29

## 2019-12-24 RX ADMIN — DONEPEZIL HYDROCHLORIDE 10 MG: 5 TABLET, FILM COATED ORAL at 00:04

## 2019-12-24 RX ADMIN — APIXABAN 2.5 MG: 2.5 TABLET, FILM COATED ORAL at 00:03

## 2019-12-24 RX ADMIN — METOPROLOL SUCCINATE 50 MG: 50 TABLET, EXTENDED RELEASE ORAL at 09:29

## 2019-12-24 RX ADMIN — ATORVASTATIN CALCIUM 40 MG: 40 TABLET, FILM COATED ORAL at 00:03

## 2019-12-24 RX ADMIN — POTASSIUM CITRATE 10 MEQ: 5 TABLET ORAL at 09:29

## 2019-12-24 RX ADMIN — FERROUS SULFATE TAB 325 MG (65 MG ELEMENTAL FE) 325 MG: 325 (65 FE) TAB at 00:04

## 2019-12-24 RX ADMIN — NEPHROCAP 1 MG: 1 CAP ORAL at 09:29

## 2019-12-24 RX ADMIN — FERROUS SULFATE TAB 325 MG (65 MG ELEMENTAL FE) 325 MG: 325 (65 FE) TAB at 09:29

## 2019-12-24 RX ADMIN — ISOSORBIDE MONONITRATE 30 MG: 30 TABLET ORAL at 09:29

## 2019-12-24 RX ADMIN — LEVOTHYROXINE SODIUM 50 MCG: 50 TABLET ORAL at 09:29

## 2019-12-24 RX ADMIN — Medication 1 CAPSULE: at 09:29

## 2019-12-24 RX ADMIN — CALCIUM CARBONATE-VITAMIN D TAB 500 MG-200 UNIT 1 TABLET: 500-200 TAB at 09:29

## 2019-12-24 RX ADMIN — LOSARTAN POTASSIUM 25 MG: 25 TABLET, FILM COATED ORAL at 09:29

## 2019-12-24 ASSESSMENT — PAIN SCALES - GENERAL: PAINLEVEL_OUTOF10: 0

## 2019-12-26 LAB
BLOOD CULTURE, ROUTINE: NORMAL
CULTURE, BLOOD 2: NORMAL

## 2020-01-02 ENCOUNTER — CARE COORDINATION (OUTPATIENT)
Dept: CARE COORDINATION | Age: 85
End: 2020-01-02

## 2020-01-02 PROCEDURE — 1111F DSCHRG MED/CURRENT MED MERGE: CPT | Performed by: INTERNAL MEDICINE

## 2020-01-23 ENCOUNTER — OFFICE VISIT (OUTPATIENT)
Dept: CARDIOLOGY CLINIC | Age: 85
End: 2020-01-23
Payer: MEDICARE

## 2020-01-23 VITALS
SYSTOLIC BLOOD PRESSURE: 118 MMHG | WEIGHT: 120 LBS | DIASTOLIC BLOOD PRESSURE: 62 MMHG | BODY MASS INDEX: 25.19 KG/M2 | HEART RATE: 52 BPM | HEIGHT: 58 IN | RESPIRATION RATE: 18 BRPM

## 2020-01-23 PROCEDURE — G8417 CALC BMI ABV UP PARAM F/U: HCPCS | Performed by: INTERNAL MEDICINE

## 2020-01-23 PROCEDURE — G8427 DOCREV CUR MEDS BY ELIG CLIN: HCPCS | Performed by: INTERNAL MEDICINE

## 2020-01-23 PROCEDURE — 93000 ELECTROCARDIOGRAM COMPLETE: CPT | Performed by: INTERNAL MEDICINE

## 2020-01-23 PROCEDURE — 99214 OFFICE O/P EST MOD 30 MIN: CPT | Performed by: INTERNAL MEDICINE

## 2020-01-23 PROCEDURE — 1090F PRES/ABSN URINE INCON ASSESS: CPT | Performed by: INTERNAL MEDICINE

## 2020-01-23 PROCEDURE — 4040F PNEUMOC VAC/ADMIN/RCVD: CPT | Performed by: INTERNAL MEDICINE

## 2020-01-23 PROCEDURE — 1036F TOBACCO NON-USER: CPT | Performed by: INTERNAL MEDICINE

## 2020-01-23 PROCEDURE — 1123F ACP DISCUSS/DSCN MKR DOCD: CPT | Performed by: INTERNAL MEDICINE

## 2020-01-23 PROCEDURE — G8484 FLU IMMUNIZE NO ADMIN: HCPCS | Performed by: INTERNAL MEDICINE

## 2020-01-23 NOTE — PROGRESS NOTES
[facility] Cardiology Progress Note    Patient is a 80 y.o. female of Arron Schaeffer MD seen in follow up. Chief complaint: NSTEMI/PAF    HPI: No CP or SOB.      Patient Active Problem List   Diagnosis    Hyperlipemia    Hypothyroid    HTN (hypertension)    Idiopathic scoliosis of thoracolumbar spine    Spondylosis of lumbar region without myelopathy or radiculopathy    NSTEMI (non-ST elevated myocardial infarction) ([address]    Abrasion of face    Acute myocardial infarction (HCC)    Weakness    Atrial fibrillation (HCC)       Allergies   Allergen Reactions    Codeine     Penicillins        Current Outpatient Medications   Medication Sig Dispense Refill    losartan (COZAAR) 25 MG tablet Take 1 tablet by mouth daily 30 tablet 3    metoprolol succinate (TOPROL XL) 50 MG extended release tablet Take 1 tablet by mouth daily 30 tablet 3    apixaban (ELIQUIS) 2.5 MG TABS tablet Take 1 tablet by mouth 2 times daily 60 tablet 1    potassium citrate (UROCIT-K) 5 MEQ (540 MG) extended release tablet Take 2 tablets by mouth daily 30 tablet 1    b complex vitamins capsule Take 1 capsule by mouth every morning      calcium carbonate-vitamin D3 (CALCIUM 600+D3) 600-400 MG-UNIT TABS Take 1 tablet by mouth 2 times daily      potassium chloride (MICRO-K) 10 MEQ extended release capsule Take 10 mEq by mouth daily      aspirin 81 MG EC tablet Take 1 tablet by mouth daily 30 tablet 3    isosorbide mononitrate (IMDUR) 30 MG extended release tablet Take 1 tablet by mouth daily 30 tablet 3    atorvastatin (LIPITOR) 40 MG tablet Take 1 tablet by mouth nightly 30 tablet 3    ferrous sulfate (FEOSOL) 325 (65 Fe) MG tablet Take 1 tablet by mouth 2 times daily Indications: Per Dr. Marilu Phipps reference to lab results on 7/2/19 60 tablet 1    donepezil (ARICEPT) 10 MG tablet Take 1 tablet by mouth nightly 90 tablet 1    levothyroxine (SYNTHROID) 50 MCG tablet TAKE 1 TABLET BY MOUTH DAILY 90 tablet 1    Lactobacillus right ventricle structure and function. Moderate aortic regurgitation is noted. Aortic valve pressure half-time 444 ms. Physiologic and/or trace mitral regurgitation is present. Mild tricuspid regurgitation. RVSP is 38 mmHg. Agitated saline injected for shunt evaluation. No evidence of patent foramen ovale. No evidence of atrial septal defect. ASSESSMENT & PLAN:    Patient Active Problem List   Diagnosis    Hyperlipemia    Hypothyroid    HTN (hypertension)    Idiopathic scoliosis of thoracolumbar spine    Spondylosis of lumbar region without myelopathy or radiculopathy    NSTEMI (non-ST elevated myocardial infarction) ([address]    Abrasion of face    Acute myocardial infarction ([address]    Weakness    Atrial fibrillation ([address]     1. ACS/NSTEMI:    Patient desires conservative treatment, which is very reasonable. Echo as above. ASA/statin/BB/imdur. 2. PAF: In sinus. On Eliquis. 3. HTN: Observe. 4. Lipids: Statin. 5. Hypothyroid. Ryan Champion D.O.   Cardiologist  Cardiology, [address] [facility]

## 2020-06-25 ENCOUNTER — HOSPITAL ENCOUNTER (EMERGENCY)
Age: 85
Discharge: HOME | End: 2020-06-25
Payer: MEDICARE

## 2020-06-25 VITALS
HEART RATE: 73 BPM | WEIGHT: 120 LBS | HEIGHT: 62 IN | RESPIRATION RATE: 17 BRPM | DIASTOLIC BLOOD PRESSURE: 71 MMHG | OXYGEN SATURATION: 93 % | BODY MASS INDEX: 22.08 KG/M2 | SYSTOLIC BLOOD PRESSURE: 104 MMHG | TEMPERATURE: 97 F

## 2020-06-25 PROCEDURE — 2500000003 HC RX 250 WO HCPCS: Performed by: PHYSICIAN ASSISTANT

## 2020-06-25 PROCEDURE — 99283 EMERGENCY DEPT VISIT LOW MDM: CPT

## 2020-06-25 PROCEDURE — 12002 RPR S/N/AX/GEN/TRNK2.6-7.5CM: CPT

## 2020-06-25 RX ORDER — LIDOCAINE HYDROCHLORIDE 10 MG/ML
10 INJECTION, SOLUTION EPIDURAL; INFILTRATION; INTRACAUDAL; PERINEURAL ONCE
Status: COMPLETED | OUTPATIENT
Start: 2020-06-25 | End: 2020-06-25

## 2020-06-25 RX ADMIN — LIDOCAINE HYDROCHLORIDE 10 ML: 10 INJECTION, SOLUTION EPIDURAL; INFILTRATION; INTRACAUDAL; PERINEURAL at 20:44

## 2020-06-25 NOTE — ED PROVIDER NOTES
[facility]       Department of Emergency Medicine   ED  Provider Note  Admit Date/RoomTime: 6/25/2020  6:31 PM  ED Room: 38/38                HPI:  6/25/20, Time: 6:39 PM EDT  . Sindy Gayle is a 80 y.o. old female presenting to the emergency department for a laceration to the left lower leg, caused by unsure, which occured 2 hour(s) prior to arrival following a fall. There is not a possibility of retained foreign body in the affected area. The patients tetanus status is within past 5 years. Bleeding is  controlled. There is no pain at injury site. The injury was not work related. Patient reports that she tripped and fell earlier today while at the doctor's office. Denies hitting her head or loss of consciousness. Patient currently denies any pain particularly any neck pain or head pain. Afebrile at home without recent travel or sick contacts. Patient denies all other symptoms at this time. Review of Systems:   Pertinent positives and negatives are stated within HPI, all other systems reviewed and are negative.        --------------------------------------------- PAST HISTORY ---------------------------------------------  Past Medical History:  has a past medical history of Chronic back pain, Hyperlipidemia, Hypertension, and Thyroid disease. Past Surgical History:  has a past surgical history that includes Tonsillectomy. Social History:  reports that she quit smoking about 51 years ago. Her smoking use included cigarettes. She started smoking about 73 years ago. She has a 11.00 pack-year smoking history. She has never used smokeless tobacco. She reports current alcohol use. She reports that she does not use drugs. Family History: family history includes Cancer in her brother; Other in her father and mother. The patients home medications have been reviewed.     Allergies: Codeine and Penicillins    -------------------------------------------------- RESULTS -------------------------------------------------  All laboratory and radiology results have been personally reviewed by myself   LABS:  No results found for this visit on 06/25/20. RADIOLOGY:  Interpreted by Radiologist.  No orders to display       ------------------------- NURSING NOTES AND VITALS REVIEWED ---------------------------   The nursing notes within the ED encounter and vital signs as below have been reviewed. /71   Pulse 73   Temp 97 °F (36.1 °C) (Temporal)   Resp 17   Ht 5' 2\" (1.575 m)   Wt 120 lb (54.4 kg)   SpO2 93%   BMI 21.95 kg/m²   Oxygen Saturation Interpretation: Normal      ---------------------------------------------------PHYSICAL EXAM--------------------------------------      Constitutional/General: Alert and oriented x3, well appearing, non toxic in NAD  Head: Normocephalic and atraumatic  Eyes: PERRL, EOMI  Mouth: Oropharynx clear, handling secretions, no trismus  Neck: Supple, full ROM,   Pulmonary: Lungs clear to auscultation bilaterally, no wheezes, rales, or rhonchi. Not in respiratory distress  Cardiovascular:  Regular rate and rhythm, no murmurs, gallops, or rubs. 2+ distal pulses  Abdomen: Soft, non tender, non distended,   Extremities: Moves all extremities x 4. Warm and well perfused  Skin: warm and dry without rash. There is a laceration of the left lower leg, which measures 5cm. It is a cutaneous thickness laceration. There is no evidence of foreign body or gross contamination. Neurologic: GCS 15,  Psych: Normal Affect      ------------------------------ ED COURSE/MEDICAL DECISION MAKING----------------------  Medications   lidocaine PF 1 % injection 10 mL (10 mLs Intradermal Given 6/25/20 2044)             LACERATION REPAIR  PROCEDURE NOTE:  Unless otherwise indicated, this procedure was done or directly supervised by the ED attending. Laceration #: 1. Location: left lower leg  Length: 5cm.   The wound area was cleansend with shur-clens and draped in

## 2020-06-26 NOTE — ED NOTES
This nurse made several attempts to call n2n report with no answer at the number listed     Luan Spann, MILDRED  06/25/20 1528

## 2020-06-26 NOTE — ED NOTES
Spoke to Corry (patient niece) to update family     Svetlana Ordaz, [facility]  06/25/20 2044

## 2020-10-24 NOTE — CARE COORDINATION
Spoke with Meme Varela. Tylor Alvarado stated that Pt was driving up to 2 months ago and now Dementia is progressing. Family made decision that Pt needs to move to Assisted Living side of [facility] since the fall. Pt has grown weaker. NELLI informed Tylor Alvarado that Pt agreed to go to Rehab part and was considering Assisted living. Tylor Alvarado was happy to hear this. Tylor Alvarado asked NELLI to request Dr. Kerri Urias inform Pt that she needed to go to Assisted Living. Pt listens to Dr. Kerri Urias and respects his judgement. NELLI and [employer] spoke with Pt. Pt agrees to [facility] then move to Assisted Living. Discharge Plan is [facility]. Referral made to Cyn Bailey. Received call back. Cyn Bailey on vacation. Covering person is sending information for review. NELLI informed the earliest Pt would be ready would be Sunday. room air

## 2023-02-18 NOTE — DISCHARGE SUMMARY
Physician Discharge Summary     Patient ID:  Cristian William  [id number]  80 y.o.  2/19/1929    Admit date: 9/18/2019    Discharge date and time: 9/23/2019  7:15 PM     Admission Diagnoses: NSTEMI (non-ST elevated myocardial infarction) ([facility] [I21.4]  NSTEMI (non-ST elevated myocardial infarction) [facility]) [I21.4]    Discharge Diagnoses:   Non-ST elevation myocardial infarction  Right hip contusion  Multiple contusions  Syncope at home  Multiple comorbidities as listed below  Patient Active Problem List   Diagnosis    Hyperlipemia    Hypothyroid    HTN (hypertension)    Idiopathic scoliosis of thoracolumbar spine    Spondylosis of lumbar region without myelopathy or radiculopathy    NSTEMI (non-ST elevated [facility] infarction) ([facility]    Abrasion of face       Consults: cardiologist    Procedures:    Hospital Course:   59-year-old  woman was performed on the floor by the neighbor  No definite history of syncope  Admitted with inability to ambulate  Troponins were elevated suggestive of acute MI  Conservative management requested by the family members  Right hip imaging showed no fracture  Medical management was recommended  Discharged to [facility] for further management    Discharge Exam:  See progress note from today    Disposition: ECF  Stable at the time of discharge  Patient Instructions:   Discharge Medication List as of 9/23/2019  4:43 PM      START taking these medications    Details   aspirin 81 MG EC tablet Take 1 tablet by mouth daily, Disp-30 tablet, R-3DC to SNF      isosorbide mononitrate (IMDUR) 30 MG extended release tablet Take 1 tablet by mouth daily, Disp-30 tablet, R-3DC to SNF      enoxaparin (LOVENOX) 30 MG/0.3ML injection Inject 0.3 mLs into the skin daily, R-0DC to SNF      metoprolol succinate (TOPROL XL) 25 MG extended release tablet Take 1 tablet by mouth daily, Disp-30 tablet, R-3DC to SNF         CONTINUE these medications which have CHANGED    Details   atorvastatin (LIPITOR) 40 MG
Stable